# Patient Record
Sex: FEMALE | Race: WHITE | NOT HISPANIC OR LATINO | Employment: PART TIME | URBAN - METROPOLITAN AREA
[De-identification: names, ages, dates, MRNs, and addresses within clinical notes are randomized per-mention and may not be internally consistent; named-entity substitution may affect disease eponyms.]

---
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
## 2020-02-06 ENCOUNTER — OFFICE VISIT (OUTPATIENT)
Dept: URGENT CARE | Facility: PHYSICIAN GROUP | Age: 20
End: 2020-02-06
Payer: COMMERCIAL

## 2020-02-06 ENCOUNTER — HOSPITAL ENCOUNTER (OUTPATIENT)
Dept: RADIOLOGY | Facility: MEDICAL CENTER | Age: 20
End: 2020-02-06
Attending: FAMILY MEDICINE
Payer: COMMERCIAL

## 2020-02-06 VITALS
DIASTOLIC BLOOD PRESSURE: 70 MMHG | RESPIRATION RATE: 12 BRPM | BODY MASS INDEX: 20.89 KG/M2 | OXYGEN SATURATION: 97 % | SYSTOLIC BLOOD PRESSURE: 98 MMHG | HEART RATE: 78 BPM | TEMPERATURE: 99 F | WEIGHT: 130 LBS | HEIGHT: 66 IN

## 2020-02-06 DIAGNOSIS — R10.84 GENERALIZED ABDOMINAL PAIN: ICD-10-CM

## 2020-02-06 DIAGNOSIS — R10.84 GENERALIZED ABDOMINAL PAIN: Primary | ICD-10-CM

## 2020-02-06 LAB

## 2020-02-06 PROCEDURE — 99214 OFFICE O/P EST MOD 30 MIN: CPT | Performed by: FAMILY MEDICINE

## 2020-02-06 PROCEDURE — 74018 RADEX ABDOMEN 1 VIEW: CPT

## 2020-02-06 PROCEDURE — 81025 URINE PREGNANCY TEST: CPT | Performed by: FAMILY MEDICINE

## 2020-02-06 PROCEDURE — 81002 URINALYSIS NONAUTO W/O SCOPE: CPT | Performed by: FAMILY MEDICINE

## 2020-02-07 ENCOUNTER — TELEPHONE (OUTPATIENT)
Dept: URGENT CARE | Facility: PHYSICIAN GROUP | Age: 20
End: 2020-02-07

## 2020-02-07 NOTE — PROGRESS NOTES
"Subjective:   Lizeth Palmer  is a 20 y.o. female who presents for Abdominal Pain (bloating, sharp pains, lower ABD, started last night)        Abdominal Pain   This is a new problem. The current episode started yesterday. The onset quality is sudden. The problem occurs constantly. The problem has been waxing and waning. The pain is located in the generalized abdominal region. The pain is moderate. The quality of the pain is colicky and cramping. Associated symptoms include anorexia. Pertinent negatives include no diarrhea, dysuria, fever, hematochezia, hematuria, melena, nausea or vomiting.     Review of Systems   Constitutional: Negative for fever.   Gastrointestinal: Positive for abdominal pain and anorexia. Negative for diarrhea, hematochezia, melena, nausea and vomiting.   Genitourinary: Negative for dysuria and hematuria.     Allergies   Allergen Reactions   • Anti-Itch Rash      Objective:   BP (!) 98/70   Pulse 78   Temp 37.2 °C (99 °F) (Temporal)   Resp 12   Ht 1.676 m (5' 6\")   Wt 59 kg (130 lb)   LMP 01/23/2020 (Approximate)   SpO2 97%   BMI 20.98 kg/m²   Physical Exam  Constitutional:       General: She is not in acute distress.     Appearance: She is well-developed.   HENT:      Head: Normocephalic and atraumatic.   Eyes:      Conjunctiva/sclera: Conjunctivae normal.      Pupils: Pupils are equal, round, and reactive to light.   Cardiovascular:      Rate and Rhythm: Normal rate and regular rhythm.      Heart sounds: No murmur.   Pulmonary:      Effort: Pulmonary effort is normal. No respiratory distress.      Breath sounds: Normal breath sounds.   Abdominal:      General: There is distension.      Palpations: Abdomen is soft.      Tenderness: There is tenderness. There is no right CVA tenderness, left CVA tenderness, guarding or rebound.   Skin:     General: Skin is warm and dry.   Neurological:      Mental Status: She is alert and oriented to person, place, and time.      Sensory: No sensory " deficit.      Deep Tendon Reflexes: Reflexes are normal and symmetric.           Assessment/Plan:   1. Generalized abdominal pain  - [id number] VIEW; Future  - POCT Urinalysis  - POCT Pregnancy  Moderate stool burden on my read of KUB. OTC colace per 's directions.   Differential diagnosis, natural history, supportive care, and indications for immediate follow-up discussed.

## 2020-02-10 ASSESSMENT — ENCOUNTER SYMPTOMS
ABDOMINAL PAIN: 1
ANOREXIA: 1
FEVER: 0
NAUSEA: 0
HEMATOCHEZIA: 0
DIARRHEA: 0
VOMITING: 0

## 2020-07-09 ENCOUNTER — OFFICE VISIT (OUTPATIENT)
Dept: MEDICAL GROUP | Facility: PHYSICIAN GROUP | Age: 20
End: 2020-07-09
Payer: COMMERCIAL

## 2020-07-09 VITALS
DIASTOLIC BLOOD PRESSURE: 62 MMHG | RESPIRATION RATE: 16 BRPM | TEMPERATURE: 98.3 F | HEART RATE: 75 BPM | BODY MASS INDEX: 21.26 KG/M2 | HEIGHT: 66 IN | SYSTOLIC BLOOD PRESSURE: 100 MMHG | WEIGHT: 132.28 LBS | OXYGEN SATURATION: 97 %

## 2020-07-09 DIAGNOSIS — Z30.41 ENCOUNTER FOR SURVEILLANCE OF CONTRACEPTIVE PILLS: ICD-10-CM

## 2020-07-09 DIAGNOSIS — G43.109 MIGRAINE WITH AURA AND WITHOUT STATUS MIGRAINOSUS, NOT INTRACTABLE: ICD-10-CM

## 2020-07-09 DIAGNOSIS — Z00.00 ROUTINE PHYSICAL EXAMINATION: ICD-10-CM

## 2020-07-09 DIAGNOSIS — N94.6 DYSMENORRHEA: ICD-10-CM

## 2020-07-09 PROCEDURE — 99395 PREV VISIT EST AGE 18-39: CPT | Performed by: FAMILY MEDICINE

## 2020-07-09 RX ORDER — NORGESTIMATE AND ETHINYL ESTRADIOL 7DAYSX3 28
1 KIT ORAL
Qty: 28 TAB | Refills: 11 | Status: SHIPPED | OUTPATIENT
Start: 2020-07-09 | End: 2021-06-28 | Stop reason: SDUPTHER

## 2020-07-09 ASSESSMENT — LIFESTYLE VARIABLES
DURING THE PAST 12 MONTHS, ON HOW MANY DAYS DID YOU DRINK MORE THAN A FEW SIPS OF BEER, WINE, OR ANY DRINK CONTAINING ALCOHOL: 0
DURING THE PAST 12 MONTHS, ON HOW MANY DAYS DID YOU USE ANY MARIJUANA: 0
DURING THE PAST 12 MONTHS, ON HOW MANY DAYS DID YOU USE ANY TOBACCO OR NICOTINE PRODUCTS: 0
PART A TOTAL SCORE: 0
DURING THE PAST 12 MONTHS, ON HOW MANY DAYS DID YOU USE ANYTHING ELSE TO GET HIGH: 0

## 2020-07-09 ASSESSMENT — FIBROSIS 4 INDEX: FIB4 SCORE: 0.34

## 2020-07-09 ASSESSMENT — PATIENT HEALTH QUESTIONNAIRE - PHQ9: CLINICAL INTERPRETATION OF PHQ2 SCORE: 0

## 2020-07-10 NOTE — PROGRESS NOTES
Subjective:      Lizeth Palmer is a 20 y.o. female who presents with Annual Exam            HPI     Patient returns for annual physical exam.  She is due for HPV vaccination.  She will be due for GYN exam/Pap smear when she turns 21 next year.    She continues to take OCP to manage for dysmenorrhea as well as for contraception.  This has been working well for her without any problems.  This is helping control her painful menstrual cramps.    She said her migraine headache is very rare happening once a year.  She takes Treximet as needed for abortive treatment with good results.  She still has supply of the medication.    I reviewed the following    Past Medical History:   Diagnosis Date   • Dysmenorrhea    • Recurrent UTI         History reviewed. No pertinent surgical history.    Allergies   Allergen Reactions   • Anti-Itch Rash       Current Outpatient Medications   Medication Sig Dispense Refill   • Norgestim-Eth Estrad Triphasic 0.18/0.215/0.25 MG-35 MCG Tab Take 1 Tab by mouth every day. 28 Tab 11   • fluticasone (FLONASE) 50 MCG/ACT nasal spray Spray 2 Sprays in nose every day. 1 Bottle 5   • Sumatriptan-Naproxen Sodium  MG Tab TAKE 1 TABLET BY MOUTH AT ONSET OF HEADACHE AND REPEAT AFTER 2 HOURS AS NEEDED. TOTAL OF 2 TABLETS IN 24 HOURS 9 Tab 0     No current facility-administered medications for this visit.         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   • Occupation:    Social Needs   •  "Financial resource strain: Not on file   • Food insecurity     Worry: Not on file     Inability: Not on file   • Transportation needs     Medical: Not on file     Non-medical: Not on file   Tobacco Use   • Smoking status: Never Smoker   • Smokeless tobacco: Never Used   Substance and Sexual Activity   • Alcohol use: Yes     Alcohol/week: 0.0 oz     Comment: rare   • Drug use: No   • Sexual activity: Never   Lifestyle   • Physical activity     Days per week: Not on file     Minutes per session: Not on file   • Stress: Not on file   Relationships   • Social connections     Talks on phone: Not on file     Gets together: Not on file     Attends Advent service: Not on file     Active member of club or organization: Not on file     Attends meetings of clubs or organizations: Not on file     Relationship status: Not on file   • Intimate partner violence     Fear of current or ex partner: Not on file     Emotionally abused: Not on file     Physically abused: Not on file     Forced sexual activity: Not on file   Other Topics Concern   • Behavioral problems Not Asked   • Interpersonal relationships Not Asked   • Sad or not enjoying activities Not Asked   • Suicidal thoughts Not Asked   • Poor school performance Not Asked   • Reading difficulties Not Asked   • Speech difficulties Not Asked   • Writing difficulties Not Asked   • Inadequate sleep Not Asked   • Excessive TV viewing Not Asked   • Excessive video game use Not Asked   • Inadequate exercise Not Asked   • Sports related Not Asked   • Poor diet Not Asked   • Family concerns for drug/alcohol abuse Not Asked   • Poor oral hygiene Not Asked   • Bike safety Not Asked   • Family concerns vehicle safety Not Asked   Social History Narrative   • Not on file        ROS     As per HPI, the rest are negative.       Objective:     /62   Pulse 75   Temp 36.8 °C (98.3 °F)   Resp 16   Ht 1.676 m (5' 6\")   Wt 60 kg (132 lb 4.4 oz)   SpO2 97%   BMI 21.35 kg/m²  "     Physical Exam  Vitals signs and nursing note reviewed.   Constitutional:       General: She is not in acute distress.     Appearance: She is well-developed. She is not diaphoretic.   HENT:      Head: Normocephalic and atraumatic.      Right Ear: External ear normal.      Left Ear: External ear normal.      Nose: Nose normal.      Mouth/Throat:      Pharynx: No oropharyngeal exudate.   Eyes:      General: No scleral icterus.        Right eye: No discharge.         Left eye: No discharge.      Conjunctiva/sclera: Conjunctivae normal.      Pupils: Pupils are equal, round, and reactive to light.   Neck:      Musculoskeletal: Normal range of motion and neck supple.      Thyroid: No thyromegaly.      Vascular: No JVD.      Trachea: No tracheal deviation.   Cardiovascular:      Rate and Rhythm: Normal rate and regular rhythm.      Heart sounds: Normal heart sounds. No murmur. No friction rub. No gallop.    Pulmonary:      Effort: Pulmonary effort is normal. No respiratory distress.      Breath sounds: Normal breath sounds. No stridor. No wheezing or rales.   Chest:      Chest wall: No tenderness.   Abdominal:      General: Bowel sounds are normal. There is no distension.      Palpations: Abdomen is soft. There is no mass.      Tenderness: There is no abdominal tenderness. There is no guarding or rebound.      Hernia: No hernia is present.   Musculoskeletal: Normal range of motion.         General: No tenderness or deformity.   Lymphadenopathy:      Cervical: No cervical adenopathy.   Skin:     General: Skin is warm and dry.      Coloration: Skin is not pale.      Findings: No erythema or rash.   Neurological:      Mental Status: She is alert and oriented to person, place, and time.      Cranial Nerves: No cranial nerve deficit.      Motor: No abnormal muscle tone.      Coordination: Coordination normal.      Deep Tendon Reflexes: Reflexes are normal and symmetric. Reflexes normal.   Psychiatric:         Behavior:  Behavior normal.         Thought Content: Thought content normal.         Judgment: Judgment normal.                      Assessment/Plan:       1. Routine physical examination  Complete exam done.  We discussed HPV vaccination but she wants to hold off on this.  Made her aware she can always walk-in to get a shot or we can give it next year.  I told her this is covered by insurance after age 26.  She will need a flu shot in the fall.  Orders were done for screening labs.  She will return yearly for physical exam and next year she will need to have a Pap smear when she turns 21.  - Lipid Profile; Future  - Comp Metabolic Panel; Future  - CBC WITH DIFFERENTIAL; Future    2. Dysmenorrhea  Doing very well on OCP with good control of her menstrual cramps.  - Norgestim-Eth Estrad Triphasic 0.18/0.215/0.25 MG-35 MCG Tab; Take 1 Tab by mouth every day.  Dispense: 28 Tab; Refill: 11    3. Encounter for surveillance of contraceptive pills  Continue OCP.  Refills given.  - Norgestim-Eth Estrad Triphasic 0.18/0.215/0.25 MG-35 MCG Tab; Take 1 Tab by mouth every day.  Dispense: 28 Tab; Refill: 11    4. Migraine with aura and without status migrainosus, not intractable  She only gets one episode in a year.  She continues to take Treximet for abortive treatment as needed.  She still has supply of the medication.    Return in 1 year for GYN exam/Pap smear or sooner if needed.      Please note that this dictation was created using voice recognition software. I have worked with consultants from the vendor as well as technical experts from [facility] to optimize the interface. I have made every reasonable attempt to correct obvious errors, but I expect that there are errors of grammar and possibly content I did not discover before finalizing the note.

## 2021-06-28 DIAGNOSIS — N94.6 DYSMENORRHEA: ICD-10-CM

## 2021-06-28 DIAGNOSIS — Z30.41 ENCOUNTER FOR SURVEILLANCE OF CONTRACEPTIVE PILLS: ICD-10-CM

## 2021-06-28 RX ORDER — NORGESTIMATE AND ETHINYL ESTRADIOL 7DAYSX3 28
1 KIT ORAL
Qty: 28 TABLET | Refills: 0 | Status: SHIPPED | OUTPATIENT
Start: 2021-06-28 | End: 2021-07-28 | Stop reason: SDUPTHER

## 2021-06-28 NOTE — TELEPHONE ENCOUNTER
Received request via: Patient    Was the patient seen in the last year in this department? No  LAST SEEN 07/09/.2020    Does the patient have an active prescription (recently filled or refills available) for medication(s) requested? No     PT STATES THAT SHE IS COMPLETELY OUT.

## 2021-07-28 DIAGNOSIS — N94.6 DYSMENORRHEA: ICD-10-CM

## 2021-07-28 DIAGNOSIS — Z30.41 ENCOUNTER FOR SURVEILLANCE OF CONTRACEPTIVE PILLS: ICD-10-CM

## 2021-07-28 RX ORDER — NORGESTIMATE AND ETHINYL ESTRADIOL 7DAYSX3 28
1 KIT ORAL
Qty: 28 TABLET | Refills: 0 | Status: SHIPPED | OUTPATIENT
Start: 2021-07-28 | End: 2021-08-04 | Stop reason: SDUPTHER

## 2021-07-28 NOTE — TELEPHONE ENCOUNTER
Received request via: Patient    Was the patient seen in the last year in this department? No     Does the patient have an active prescription (recently filled or refills available) for medication(s) requested?  NO     Patient has appt scheduled for 8/4/21

## 2021-08-04 ENCOUNTER — TELEPHONE (OUTPATIENT)
Dept: MEDICAL GROUP | Facility: PHYSICIAN GROUP | Age: 21
End: 2021-08-04

## 2021-08-04 ENCOUNTER — OFFICE VISIT (OUTPATIENT)
Dept: MEDICAL GROUP | Facility: PHYSICIAN GROUP | Age: 21
End: 2021-08-04
Payer: COMMERCIAL

## 2021-08-04 VITALS
DIASTOLIC BLOOD PRESSURE: 60 MMHG | OXYGEN SATURATION: 93 % | BODY MASS INDEX: 22.88 KG/M2 | WEIGHT: 137.35 LBS | TEMPERATURE: 98.8 F | HEIGHT: 65 IN | SYSTOLIC BLOOD PRESSURE: 100 MMHG | HEART RATE: 63 BPM

## 2021-08-04 DIAGNOSIS — Z30.41 ENCOUNTER FOR SURVEILLANCE OF CONTRACEPTIVE PILLS: ICD-10-CM

## 2021-08-04 DIAGNOSIS — G43.109 MIGRAINE WITH AURA AND WITHOUT STATUS MIGRAINOSUS, NOT INTRACTABLE: ICD-10-CM

## 2021-08-04 DIAGNOSIS — N94.6 DYSMENORRHEA: ICD-10-CM

## 2021-08-04 DIAGNOSIS — G44.209 TENSION HEADACHE: ICD-10-CM

## 2021-08-04 PROCEDURE — 99214 OFFICE O/P EST MOD 30 MIN: CPT | Performed by: FAMILY MEDICINE

## 2021-08-04 RX ORDER — NORGESTIMATE AND ETHINYL ESTRADIOL 7DAYSX3 28
1 KIT ORAL
Qty: 28 TABLET | Refills: 11 | Status: SHIPPED
Start: 2021-08-04 | End: 2022-01-01

## 2021-08-04 RX ORDER — BUTALBITAL, ACETAMINOPHEN AND CAFFEINE 300; 40; 50 MG/1; MG/1; MG/1
1 CAPSULE ORAL EVERY 4 HOURS PRN
Qty: 60 CAPSULE | Refills: 0 | Status: SHIPPED | OUTPATIENT
Start: 2021-08-04 | End: 2021-10-19 | Stop reason: SDUPTHER

## 2021-08-04 ASSESSMENT — PATIENT HEALTH QUESTIONNAIRE - PHQ9: CLINICAL INTERPRETATION OF PHQ2 SCORE: 0

## 2021-08-04 NOTE — LETTER
[facility]  Monse Mccullough M.D.  [address]  [place] [address]  Fax: [phone]   Authorization for Release/Disclosure of   Protected Health Information   Name: JUSTO PALMER : 2000 SSN: [id number]   Address: [address] [place] [address]   [place] [address] Phone:    [phone] (home) [phone] (work)   I authorize the entity listed below to release/disclose the PHI below to:   [facility]/Monse Mccullough M.D. and Monse Mccullough M.D.   Provider or Entity Name:    [facility]   Address   City, State, [facility]   Phone:      Fax:     Reason for request: continuity of care   Information to be released:    [  ] LAST COLONOSCOPY,  including any PATH REPORT and follow-up  [  ] LAST FIT/COLOGUARD RESULT [  ] LAST DEXA  [  ] LAST MAMMOGRAM  [  ] LAST PAP  [  ] LAST LABS [  ] RETINA EXAM REPORT  [  ] IMMUNIZATION RECORDS  [  ] Release all info      [  ] Check here and initial the line next to each item to release ALL health information INCLUDING  _____ Care and treatment for drug and / or alcohol abuse  _____ HIV testing, infection status, or AIDS  _____ Genetic Testing    DATES OF SERVICE OR TIME PERIOD TO BE DISCLOSED: _____________  I understand and acknowledge that:  * This Authorization may be revoked at any time by you in writing, except if your health information has already been used or disclosed.  * Your health information that will be used or disclosed as a result of you signing this authorization could be re-disclosed by the recipient. If this occurs, your re-disclosed health information may no longer be protected by State or Federal laws.  * You may refuse to sign this Authorization. Your refusal will not affect your ability to obtain treatment.  * This Authorization becomes effective upon signing and will  on (date) __________.      If no date is indicated, this Authorization will  one (1) year from the signature date.    Name: Justo Palmer    Signature:   Date:     2021            PLEASE FAX REQUESTED RECORDS BACK TO: [phone]

## 2021-08-05 NOTE — PROGRESS NOTES
"Subjective:      Lizeth Palmer is a 21 y.o. female who presents with Contraception (refill)            HPI     Patient is here for follow-up and to get refills of OCP that she takes for management of dysmenorrhea.  This is working very well for her and she would like to continue taking the medication.    She is also here for follow-up of her migraine headache.  She said she rarely has attack of migraine headache.  She said she may have 1 or 2 a year and the last one was 1-1/2 months ago.    She is asking for refill of Fioricet for tension headache.  This works well for the headache.  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  Her tension headache is triggered by school work.  She would like to have medication on hand that she can take if she needs it.  This headache is different from her migraine headache and she is very not eligible on which headache is which and which medication to take for the type of headache.    She just turned 21 and she is due for GYN exam/Pap smear.  She said she will come back another time to get this done.      Past medical history, past surgical history, family history reviewed-no changes    Social history reviewed-no changes    Allergies reviewed-no changes    Medications reviewed-no changes    ROS     As per HPI, the rest are negative.       Objective:     /60 (BP Location: Left arm, Patient Position: Sitting, BP Cuff Size: Adult)   Pulse 63   Temp 37.1 °C (98.8 °F) (Temporal)   Ht 1.651 m (5' 5\")   Wt 62.3 kg (137 lb 5.6 oz)   SpO2 93%   BMI 22.86 kg/m²      Physical Exam     Examined alert, awake, oriented, not in distress    Neck-supple, no lymphadenopathy, no thyromegaly  Lungs-clear to auscultation, no rales, no wheezes  Heart-regular rate and rhythm, no murmur  Extremities-no edema, clubbing, cyanosis              Assessment/Plan:        1. Dysmenorrhea  Doing well and stable on OCP.  Prescription refills given for " full year supply.  We can transfer it to pharmacy in [place] when she is already living there for chiropractor school.  She will let me know.  - Norgestim-Eth Estrad Triphasic 0.18/0.215/0.25 MG-35 MCG Tab; Take 1 tablet by mouth every day.  Dispense: 28 tablet; Refill: 11    2. Encounter for surveillance of contraceptive pills  Same as #1.  - Norgestim-Eth Estrad Triphasic 0.18/0.215/0.25 MG-35 MCG Tab; Take 1 tablet by mouth every day.  Dispense: 28 tablet; Refill: 11    3. Migraine with aura and without status migrainosus, not intractable  She takes Treximet as needed with good results.  Migraine headaches are very infrequent.    4. Tension headache  She is asking for Fioricet for tension headache in case she develops this headache when she starts chiropractor school.  I gave her the number 60 tablets.  She can ask for refill later on if needed.  - acetaminophen/caffeine/butalbital 300-40-50 mg (FIORICET) -40 MG Cap capsule; Take 1 capsule by mouth every four hours as needed for Headache for up to 30 days.  Dispense: 60 capsule; Refill: 0    Return for separate visit for GYN exam/Pap smear.      Please note that this dictation was created using voice recognition software. I have worked with consultants from the vendor as well as technical experts from [facility] to optimize the interface. I have made every reasonable attempt to correct obvious errors, but I expect that there are errors of grammar and possibly content I did not discover before finalizing the note.

## 2021-08-09 DIAGNOSIS — M25.562 LEFT KNEE PAIN, UNSPECIFIED CHRONICITY: ICD-10-CM

## 2021-08-31 DIAGNOSIS — G44.209 TENSION HEADACHE: ICD-10-CM

## 2021-08-31 DIAGNOSIS — Z30.41 ENCOUNTER FOR SURVEILLANCE OF CONTRACEPTIVE PILLS: ICD-10-CM

## 2021-08-31 DIAGNOSIS — N94.6 DYSMENORRHEA: ICD-10-CM

## 2021-08-31 RX ORDER — BUTALBITAL, ACETAMINOPHEN AND CAFFEINE 300; 40; 50 MG/1; MG/1; MG/1
1 CAPSULE ORAL EVERY 4 HOURS PRN
Qty: 60 CAPSULE | Refills: 0 | OUTPATIENT
Start: 2021-08-31 | End: 2021-09-30

## 2021-08-31 RX ORDER — NORGESTIMATE AND ETHINYL ESTRADIOL 7DAYSX3 28
1 KIT ORAL
Qty: 28 TABLET | Refills: 0 | OUTPATIENT
Start: 2021-08-31

## 2021-09-05 ENCOUNTER — HOSPITAL ENCOUNTER (OUTPATIENT)
Facility: MEDICAL CENTER | Age: 21
End: 2021-09-05
Attending: NURSE PRACTITIONER
Payer: COMMERCIAL

## 2021-09-05 ENCOUNTER — OFFICE VISIT (OUTPATIENT)
Dept: URGENT CARE | Facility: PHYSICIAN GROUP | Age: 21
End: 2021-09-05
Payer: COMMERCIAL

## 2021-09-05 VITALS
OXYGEN SATURATION: 98 % | HEART RATE: 88 BPM | HEIGHT: 65 IN | SYSTOLIC BLOOD PRESSURE: 126 MMHG | TEMPERATURE: 98.2 F | DIASTOLIC BLOOD PRESSURE: 84 MMHG | RESPIRATION RATE: 18 BRPM | BODY MASS INDEX: 21.99 KG/M2 | WEIGHT: 132 LBS

## 2021-09-05 DIAGNOSIS — R30.0 DYSURIA: ICD-10-CM

## 2021-09-05 DIAGNOSIS — N89.8 VAGINAL DISCHARGE: ICD-10-CM

## 2021-09-05 DIAGNOSIS — R35.0 URINARY FREQUENCY: ICD-10-CM

## 2021-09-05 DIAGNOSIS — N30.00 ACUTE CYSTITIS WITHOUT HEMATURIA: ICD-10-CM

## 2021-09-05 LAB
APPEARANCE UR: CLEAR
BILIRUB UR STRIP-MCNC: NORMAL MG/DL
COLOR UR AUTO: YELLOW
GLUCOSE UR STRIP.AUTO-MCNC: NORMAL MG/DL
KETONES UR STRIP.AUTO-MCNC: NORMAL MG/DL
LEUKOCYTE ESTERASE UR QL STRIP.AUTO: NORMAL
NITRITE UR QL STRIP.AUTO: NORMAL
PH UR STRIP.AUTO: 7 [PH] (ref 5–8)
PROT UR QL STRIP: NORMAL MG/DL
RBC UR QL AUTO: NORMAL
SP GR UR STRIP.AUTO: 1.01
UROBILINOGEN UR STRIP-MCNC: 0.2 MG/DL

## 2021-09-05 PROCEDURE — 87510 GARDNER VAG DNA DIR PROBE: CPT

## 2021-09-05 PROCEDURE — 81002 URINALYSIS NONAUTO W/O SCOPE: CPT | Performed by: NURSE PRACTITIONER

## 2021-09-05 PROCEDURE — 99214 OFFICE O/P EST MOD 30 MIN: CPT | Performed by: NURSE PRACTITIONER

## 2021-09-05 PROCEDURE — 87491 CHLMYD TRACH DNA AMP PROBE: CPT

## 2021-09-05 PROCEDURE — 87480 CANDIDA DNA DIR PROBE: CPT

## 2021-09-05 PROCEDURE — 87660 TRICHOMONAS VAGIN DIR PROBE: CPT

## 2021-09-05 PROCEDURE — 87591 N.GONORRHOEAE DNA AMP PROB: CPT

## 2021-09-05 PROCEDURE — 87086 URINE CULTURE/COLONY COUNT: CPT

## 2021-09-05 RX ORDER — NITROFURANTOIN 25; 75 MG/1; MG/1
100 CAPSULE ORAL EVERY 12 HOURS
Qty: 10 CAPSULE | Refills: 0 | Status: SHIPPED | OUTPATIENT
Start: 2021-09-05 | End: 2021-09-10

## 2021-09-05 ASSESSMENT — ENCOUNTER SYMPTOMS
FLANK PAIN: 1
CHILLS: 0
VOMITING: 0
NAUSEA: 0
FEVER: 0
SWEATS: 0

## 2021-09-05 NOTE — PROGRESS NOTES
When I just about him I mean they may choose to discharge him but:     Lizeth Palmer is a 21 y.o. female who presents for Dysuria (dysuria, frequency, mild discharge, low back pain x2 wks)      Dysuria   This is a new problem. The current episode started 1 to 4 weeks ago (2 weeks ago Lizeth developed dysuria, frequency and increased vaginal discharge.). The problem occurs intermittently. The problem has been unchanged (She notes her symptoms did improve after her menses adn then returned. ). The pain is at a severity of 3/10. The pain is mild. There has been no fever. She is sexually active. There is no history of pyelonephritis. Associated symptoms include a discharge, flank pain and frequency. Pertinent negatives include no chills, hematuria, hesitancy, nausea, possible pregnancy, sweats, urgency or vomiting. Treatments tried: Azo. The treatment provided significant relief.         Review of Systems   Constitutional: Negative for chills, fever and malaise/fatigue.   Gastrointestinal: Negative for nausea and vomiting.   Genitourinary: Positive for dysuria, flank pain and frequency. Negative for hematuria, hesitancy and urgency.       PMH:   Past Medical History:   Diagnosis Date   • Dysmenorrhea    • Recurrent UTI      ALLERGIES:   Allergies   Allergen Reactions   • Anti-Itch Rash     SURGHX: History reviewed. No pertinent surgical history.  SOCHX:   Social History     Socioeconomic History   • Marital status: Single     Spouse name: Not on file   • Number of children: Not on file   • Years of education: Not on file   • Highest education level: Not on file   Occupational History   • Occupation: R college student -kineseology   • Occupation:    Tobacco Use   • Smoking status: Never Smoker   • Smokeless tobacco: Never Used   Vaping Use   • Vaping Use: Never used   Substance and Sexual Activity   • Alcohol use: Yes     Alcohol/week: 0.0 oz     Comment: rare   • Drug use: No   • Sexual activity:  Never   Other Topics Concern   • Behavioral problems Not Asked   • Interpersonal relationships Not Asked   • Sad or not enjoying activities Not Asked   • Suicidal thoughts Not Asked   • Poor school performance Not Asked   • Reading difficulties Not Asked   • Speech difficulties Not Asked   • Writing difficulties Not Asked   • Inadequate sleep Not Asked   • Excessive TV viewing Not Asked   • Excessive video game use Not Asked   • Inadequate exercise Not Asked   • Sports related Not Asked   • Poor diet Not Asked   • Family concerns for drug/alcohol abuse Not Asked   • Poor oral hygiene Not Asked   • Bike safety Not Asked   • Family concerns vehicle safety Not Asked   Social History Narrative   • Not on file     Social Determinants of Health     Financial Resource Strain:    • Difficulty of Paying Living Expenses:    Food Insecurity:    • Worried About Running Out of Food in the Last Year:    • Ran Out of Food in the Last Year:    Transportation Needs:    • Lack of Transportation (Medical):    • Lack of Transportation (Non-Medical):    Physical Activity:    • Days of Exercise per Week:    • Minutes of Exercise per Session:    Stress:    • Feeling of Stress :    Social Connections:    • Frequency of Communication with Friends and Family:    • Frequency of Social Gatherings with Friends and Family:    • Attends Gn[employer] Services:    • Active Member of Clubs or Organizations:    • Attends Club or Organization Meetings:    • Marital Status:    Intimate Partner Violence:    • Fear of Current or Ex-Partner:    • Emotionally Abused:    • Physically Abused:    • Sexually Abused:      FH:   Family History   Problem Relation Age of Onset   • GI Disease Mother         gerd   • Thyroid Mother         hypothyroidism   • GI Disease Father         diverticulosis    • No Known Problems Maternal Grandmother    • No Known Problems Maternal Grandfather          Objective:   /84 (BP Location: Left arm, Patient Position: Sitting, BP  "Cuff Size: Small adult)   Pulse 88   Temp 36.8 °C (98.2 °F) (Temporal)   Resp 18   Ht 1.651 m (5' 5\")   Wt 59.9 kg (132 lb)   SpO2 98%   BMI 21.97 kg/m²     Physical Exam  Vitals and nursing note reviewed.   Constitutional:       General: She is not in acute distress.     Appearance: Normal appearance. She is not ill-appearing.   HENT:      Head: Normocephalic and atraumatic.      Right Ear: External ear normal.      Left Ear: External ear normal.      Nose: No congestion or rhinorrhea.      Mouth/Throat:      Mouth: Mucous membranes are moist.   Eyes:      Extraocular Movements: Extraocular movements intact.      Pupils: Pupils are equal, round, and reactive to light.   Cardiovascular:      Rate and Rhythm: Normal rate and regular rhythm.      Pulses: Normal pulses.      Heart sounds: Normal heart sounds.   Pulmonary:      Effort: Pulmonary effort is normal.      Breath sounds: Normal breath sounds.   Abdominal:      General: Abdomen is flat. Bowel sounds are normal.      Palpations: Abdomen is soft.      Tenderness: There is no abdominal tenderness. There is no right CVA tenderness or left CVA tenderness.   Musculoskeletal:         General: Normal range of motion.      Cervical back: Normal range of motion and neck supple.   Skin:     General: Skin is warm and dry.      Capillary Refill: Capillary refill takes less than 2 seconds.   Neurological:      General: No focal deficit present.      Mental Status: She is alert and oriented to person, place, and time. Mental status is at baseline.   Psychiatric:         Mood and Affect: Mood normal.         Behavior: Behavior normal.         Thought Content: Thought content normal.         Judgment: Judgment normal.     POCT urine: Negative    Assessment/Plan:   Assessment    1. Acute cystitis without hematuria  Urine Culture    nitrofurantoin (MACROBID) 100 MG Cap   2. Dysuria  POCT Urinalysis    Urine Culture   3. Urinary frequency  POCT Urinalysis    Urine Culture "   4. Vaginal discharge  CHLAMYDIA/GC PCR URINE OR SWAB    VAGINAL PATHOGENS DNA PANEL     She was tested for gonorrhea/chlamydia, BV, trichomonas and yeast.  Urine was sent for culture.  She was empirically started on antibiotic today.  I will call her with results.  Patient to follow-up for fever, chills, nausea/vomiting or flank pain.  AVS handout given and reviewed with patient. Pt educated on red flags and when to seek treatment back in ER or UC.

## 2021-09-05 NOTE — PATIENT INSTRUCTIONS
Urinary Tract Infection, Adult  A urinary tract infection (UTI) is an infection of any part of the urinary tract. The urinary tract includes:  · The kidneys.  · The ureters.  · The bladder.  · The urethra.  These organs make, store, and get rid of pee (urine) in the body.  What are the causes?  This is caused by germs (bacteria) in your genital area. These germs grow and cause swelling (inflammation) of your urinary tract.  What increases the risk?  You are more likely to develop this condition if:  · You have a small, thin tube (catheter) to drain pee.  · You cannot control when you pee or poop (incontinence).  · You are female, and:  ? You use these methods to prevent pregnancy:  ? A medicine that kills sperm (spermicide).  ? A device that blocks sperm (diaphragm).  ? You have low levels of a female hormone (estrogen).  ? You are pregnant.  · You have genes that add to your risk.  · You are sexually active.  · You take antibiotic medicines.  · You have trouble peeing because of:  ? A prostate that is bigger than normal, if you are male.  ? A blockage in the part of your body that drains pee from the bladder (urethra).  ? A kidney stone.  ? A nerve condition that affects your bladder (neurogenic bladder).  ? Not getting enough to drink.  ? Not peeing often enough.  · You have other conditions, such as:  ? Diabetes.  ? A weak disease-fighting system (immune system).  ? Sickle cell disease.  ? Gout.  ? Injury of the spine.  What are the signs or symptoms?  Symptoms of this condition include:  · Needing to pee right away (urgently).  · Peeing often.  · Peeing small amounts often.  · Pain or burning when peeing.  · Blood in the pee.  · Pee that smells bad or not like normal.  · Trouble peeing.  · Pee that is cloudy.  · Fluid coming from the vagina, if you are female.  · Pain in the belly or lower back.  Other symptoms include:  · Throwing up (vomiting).  · No urge to eat.  · Feeling mixed up (confused).  · Being  tired and grouchy (irritable).  · A fever.  · Watery poop (diarrhea).  How is this treated?  This condition may be treated with:  · Antibiotic medicine.  · Other medicines.  · Drinking enough water.  Follow these instructions at home:    Medicines  · Take over-the-counter and prescription medicines only as told by your doctor.  · If you were prescribed an antibiotic medicine, take it as told by your doctor. Do not stop taking it even if you start to feel better.  General instructions  · Make sure you:  ? Pee until your bladder is empty.  ? Do not hold pee for a long time.  ? Empty your bladder after sex.  ? Wipe from front to back after pooping if you are a female. Use each tissue one time when you wipe.  · Drink enough fluid to keep your pee pale yellow.  · Keep all follow-up visits as told by your doctor. This is important.  Contact a doctor if:  · You do not get better after 1-2 days.  · Your symptoms go away and then come back.  Get help right away if:  · You have very bad back pain.  · You have very bad pain in your lower belly.  · You have a fever.  · You are sick to your stomach (nauseous).  · You are throwing up.  Summary  · A urinary tract infection (UTI) is an infection of any part of the urinary tract.  · This condition is caused by germs in your genital area.  · There are many risk factors for a UTI. These include having a small, thin tube to drain pee and not being able to control when you pee or poop.  · Treatment includes antibiotic medicines for germs.  · Drink enough fluid to keep your pee pale yellow.  This information is not intended to replace advice given to you by your health care provider. Make sure you discuss any questions you have with your health care provider.  Document Released: 06/05/2009 Document Revised: 12/05/2019 Document Reviewed: 06/27/2019  Elsevier Patient Education © 2020 [employer]    Gonorrhea  Gonorrhea is a sexually transmitted disease (STD) that can affect both men and  women. If left untreated, this infection can:  · Damage the female or male organs.  · Cause women and men to be unable to have children (be sterile).  · Harm a fetus if an infected woman is pregnant.  It is important to get treatment for gonorrhea as soon as possible. It is also necessary for all of your sexual partners to be tested for the infection.  What are the causes?  This condition is caused by bacteria called Neisseria gonorrhoeae. The infection is spread from person to person through sexual contact, including oral, anal, and vaginal sex. A  can contract the infection from his or her mother during birth.  What increases the risk?  The following factors may make you more likely to develop this condition:  · Being a woman who is younger than 25 years of age and who is sexually active.  · Being a woman 25 years of age or older who has:  ? A new sex partner.  ? More than one sex partner.  ? A sex partner who has an STD.  · Being a man who has:  ? A new sex partner.  ? More than one sex partner.  ? A sex partner who has an STD.  · Using condoms inconsistently.  · Currently having, or having previously had, an STD.  · Exchanging sex for money or drugs.  What are the signs or symptoms?  Some people do not have any symptoms. If you do have symptoms, they may be different for females and males.  For females  · Pain in the lower abdomen.  · Abnormal vaginal discharge. The discharge may be cloudy, thick, or yellow-green in color.  · Bleeding between periods.  · Painful sex.  · Burning or itching in and around the vagina.  · Pain or burning when urinating.  · Irritation, pain, bleeding, or discharge from the rectum. This may occur if the infection was spread by anal sex.  · Sore throat or swollen lymph nodes in the neck. This may occur if the infection was spread by oral sex.  For males  · Abnormal discharge from the penis. This discharge may be cloudy, thick, or yellow-green in color.  · Pain or burning during  urination.  · Pain or swelling in the testicles.  · Irritation, pain, bleeding, or discharge from the rectum. This may occur if the infection was spread by anal sex.  · Sore throat, fever, or swollen lymph nodes in the neck. This may occur if the infection was spread by oral sex.  How is this diagnosed?  This condition is diagnosed based on:  · A physical exam.  · A sample of discharge that is examined under a microscope to look for the bacteria. The discharge may be taken from the urethra, cervix, throat, or rectum.  · Urine tests.  Not all of test results will be available during your visit.  How is this treated?  This condition is treated with antibiotic medicines. It is important for treatment to begin as soon as possible. Early treatment may prevent some problems from developing. Do not have sex during treatment. Avoid all types of sexual activity for 7 days after treatment is complete and until any sex partners have been treated.  Follow these instructions at home:  · Take over-the-counter and prescription medicines only as told by your health care provider.  · Take your antibiotic medicine as told by your health care provider. Do not stop taking the antibiotic even if you start to feel better.  · Do not have sex until at least 7 days after you and your partner(s) have finished treatment and your health care provider says it is okay.  · It is your responsibility to get your test results. Ask your health care provider, or the department performing the test, when your results will be ready.  · If you test positive for gonorrhea, inform your recent sexual partners. This includes any oral, anal, or vaginal sex partners. They need to be checked for gonorrhea even if they do not have symptoms. They may need treatment, even if they test negative for gonorrhea.  · Keep all follow-up visits as told by your health care provider. This is important.  How is this prevented?    · Use latex condoms correctly every time you  have sexual intercourse.  · Ask if your sexual partner has been tested for STDs and had negative results.  · Avoid having multiple sexual partners.  Contact a health care provider if:  · You develop a bad reaction to the medicine you were prescribed. This may include:  ? A rash.  ? Nausea.  ? Vomiting.  ? Diarrhea.  · Your symptoms do not get better after a few days of taking antibiotics.  · Your symptoms get worse.  · You develop new symptoms.  · Your pain gets worse.  · You have a fever.  · You develop pain, itching, or discharge around the eyes.  Get help right away if:  · You feel dizzy or faint.  · You have trouble breathing or have shortness of breath.  · You develop an irregular heartbeat.  · You have severe abdominal pain with or without shoulder pain.  · You develop any bumps or sores (lesions) on your skin.  · You develop warmth, redness, pain, or swelling around your joints, such as the knee.  Summary  · Gonorrhea is an STD that can affect both men and women.  · This condition is caused by bacteria called Neisseria gonorrhoeae. The infection is spread from person to person, usually through sexual contact, including oral, anal, and vaginal sex.  · Symptoms vary between males and females. Generally, they include abnormal discharge and burning during urination. Women may also experience painful sex, itching around the vagina, and bleeding between menstrual periods. Men may also experience swelling of the testicles.  · This condition is treated with antibiotic medicines. Do not have sex until at least 7 days after completing antibiotic treatment.  · If left untreated, gonorrhea can have serious side effects and complications.  This information is not intended to replace advice given to you by your health care provider. Make sure you discuss any questions you have with your health care provider.  Document Released: 12/15/2001 Document Revised: 01/24/2020 Document Reviewed: 11/17/2017  Elsevier Patient  Education © 2020 [employer]    Chlamydia, Female    Chlamydia is a STD (sexually transmitted disease). This is an infection that spreads through sexual contact. If it is not treated, it can cause serious problems. It must be treated with antibiotic medicine.  If this infection is not treated and you are pregnant or become pregnant, your baby could get it during delivery. This may cause bad health problems for the baby.  Sometimes, you may not have symptoms (asymptomatic). When you have symptoms, they can include:  · Burning when you pee (urinate).  · Peeing often.  · Fluid (discharge) coming from the vagina.  · Redness, soreness, and swelling (inflammation) of the butt (rectum).  · Bleeding or fluid coming from the butt.  · Belly (abdominal) pain.  · Pain during sex.  · Bleeding between periods.  · Itching, burning, or redness in the eyes.  · Fluid coming from the eyes.  Follow these instructions at home:  Medicines  · Take over-the-counter and prescription medicines only as told by your doctor.  · Take your antibiotic medicine as told by your doctor. Do not stop taking the antibiotic even if you start to feel better.  Sexual activity  · Tell sex partners about your infection. Sex partners are people you had oral, anal, or vaginal sex with within 60 days of when you started getting sick. They need treatment, too.  · Do not have sex until:  ? You and your sex partners have been treated.  ? Your doctor says it is okay.  · If you have a single dose treatment, wait 7 days before having sex.  General instructions  · It is up to you to get your test results. Ask your doctor when your results will be ready.  · Get a lot of rest.  · Eat healthy foods.  · Drink enough fluid to keep your pee (urine) clear or pale yellow.  · Keep all follow-up visits as told by your doctor. You may need tests after 3 months.  Preventing chlamydia  · The only way to prevent chlamydia is not to have sex. To lower your risk:  ? Use latex  condoms correctly. Do this every time you have sex.  ? Avoid having many sex partners.  ? Ask if your partner has been tested for STDs and if he or she had negative results.  Contact a doctor if:  · You get new symptoms.  · You do not get better with treatment.  · You have a fever or chills.  · You have pain during sex.  Get help right away if:  · Your pain gets worse and does not get better with medicine.  · You get flu-like symptoms, such as:  ? Night sweats.  ? Sore throat.  ? Muscle aches.  · You feel sick to your stomach (nauseous).  · You throw up (vomit).  · You have trouble swallowing.  · You have bleeding:  ? Between periods.  ? After sex.  · You have irregular periods.  · You have belly pain that does not get better with medicine.  · You have lower back pain that does not get better with medicine.  · You feel weak or dizzy.  · You pass out (faint).  · You are pregnant and you get symptoms of chlamydia.  Summary  · Chlamydia is an infection that spreads through sexual contact.  · Sometimes, chlamydia can cause no symptoms (asymptomatic).  · Do not have sex until your doctor says it is okay.  · All sex partners will have to be treated for chlamydia.  This information is not intended to replace advice given to you by your health care provider. Make sure you discuss any questions you have with your health care provider.  Document Released: 09/26/2009 Document Revised: 06/11/2019 Document Reviewed: 12/07/2017  [email or web address] Patient Education © 2020 [email or web address]    Bacterial Vaginosis    Bacterial vaginosis is a vaginal infection that occurs when the normal balance of bacteria in the vagina is disrupted. It results from an overgrowth of certain bacteria. This is the most common vaginal infection among women ages 15-44.  Because bacterial vaginosis increases your risk for STIs (sexually transmitted infections), getting treated can help reduce your risk for chlamydia, gonorrhea, herpes, and HIV (human immunodeficiency  virus). Treatment is also important for preventing complications in pregnant women, because this condition can cause an early (premature) delivery.  What are the causes?  This condition is caused by an increase in harmful bacteria that are normally present in small amounts in the vagina. However, the reason that the condition develops is not fully understood.  What increases the risk?  The following factors may make you more likely to develop this condition:  · Having a new sexual partner or multiple sexual partners.  · Having unprotected sex.  · Douching.  · Having an intrauterine device (IUD).  · Smoking.  · Drug and alcohol abuse.  · Taking certain antibiotic medicines.  · Being pregnant.  You cannot get bacterial vaginosis from toilet seats, bedding, swimming pools, or contact with objects around you.  What are the signs or symptoms?  Symptoms of this condition include:  · Grey or white vaginal discharge. The discharge can also be watery or foamy.  · A fish-like odor with discharge, especially after sexual intercourse or during menstruation.  · Itching in and around the vagina.  · Burning or pain with urination.  Some women with bacterial vaginosis have no signs or symptoms.  How is this diagnosed?  This condition is diagnosed based on:  · Your medical history.  · A physical exam of the vagina.  · Testing a sample of vaginal fluid under a microscope to look for a large amount of bad bacteria or abnormal cells. Your health care provider may use a cotton swab or a small wooden spatula to collect the sample.  How is this treated?  This condition is treated with antibiotics. These may be given as a pill, a vaginal cream, or a medicine that is put into the vagina (suppository). If the condition comes back after treatment, a second round of antibiotics may be needed.  Follow these instructions at home:  Medicines  · Take over-the-counter and prescription medicines only as told by your health care provider.  · Take or  use your antibiotic as told by your health care provider. Do not stop taking or using the antibiotic even if you start to feel better.  General instructions  · If you have a female sexual partner, tell her that you have a vaginal infection. She should see her health care provider and be treated if she has symptoms. If you have a male sexual partner, he does not need treatment.  · During treatment:  ? Avoid sexual activity until you finish treatment.  ? Do not douche.  ? Avoid alcohol as directed by your health care provider.  ? Avoid breastfeeding as directed by your health care provider.  · Drink enough water and fluids to keep your urine clear or pale yellow.  · Keep the area around your vagina and rectum clean.  ? Wash the area daily with warm water.  ? Wipe yourself from front to back after using the toilet.  · Keep all follow-up visits as told by your health care provider. This is important.  How is this prevented?  · Do not douche.  · Wash the outside of your vagina with warm water only.  · Use protection when having sex. This includes latex condoms and dental dams.  · Limit how many sexual partners you have. To help prevent bacterial vaginosis, it is best to have sex with just one partner (monogamous).  · Make sure you and your sexual partner are tested for STIs.  · Wear cotton or cotton-lined underwear.  · Avoid wearing tight pants and pantyhose, especially during summer.  · Limit the amount of alcohol that you drink.  · Do not use any products that contain nicotine or tobacco, such as cigarettes and e-cigarettes. If you need help quitting, ask your health care provider.  · Do not use illegal drugs.  Where to find more information  · Centers for Disease Control and Prevention: www.cdc.gov/std  · American Sexual Health Association (KATHY): www.ashastd.org  · U.S. Department of Health and Human Services, Office on Women's Health: www.womenshealth.gov/ or  https://www.womenshealth.gov/a-z-topics/bacterial-vaginosis  Contact a health care provider if:  · Your symptoms do not improve, even after treatment.  · You have more discharge or pain when urinating.  · You have a fever.  · You have pain in your abdomen.  · You have pain during sex.  · You have vaginal bleeding between periods.  Summary  · Bacterial vaginosis is a vaginal infection that occurs when the normal balance of bacteria in the vagina is disrupted.  · Because bacterial vaginosis increases your risk for STIs (sexually transmitted infections), getting treated can help reduce your risk for chlamydia, gonorrhea, herpes, and HIV (human immunodeficiency virus). Treatment is also important for preventing complications in pregnant women, because the condition can cause an early (premature) delivery.  · This condition is treated with antibiotic medicines. These may be given as a pill, a vaginal cream, or a medicine that is put into the vagina (suppository).  This information is not intended to replace advice given to you by your health care provider. Make sure you discuss any questions you have with your health care provider.  Document Released: 12/18/2006 Document Revised: 11/30/2018 Document Reviewed: 09/02/2017  [employer] Patient Education © 2020 [employer]

## 2021-09-06 LAB
CANDIDA DNA VAG QL PROBE+SIG AMP: NEGATIVE
G VAGINALIS DNA VAG QL PROBE+SIG AMP: POSITIVE
T VAGINALIS DNA VAG QL PROBE+SIG AMP: NEGATIVE

## 2021-09-07 DIAGNOSIS — B96.89 BACTERIAL VAGINOSIS: ICD-10-CM

## 2021-09-07 DIAGNOSIS — N76.0 BACTERIAL VAGINOSIS: ICD-10-CM

## 2021-09-07 RX ORDER — METRONIDAZOLE 500 MG/1
500 TABLET ORAL
Qty: 14 TABLET | Refills: 0 | Status: SHIPPED | OUTPATIENT
Start: 2021-09-07 | End: 2021-09-14

## 2021-09-08 LAB
BACTERIA UR CULT: NORMAL
SIGNIFICANT IND 70042: NORMAL
SITE SITE: NORMAL
SOURCE SOURCE: NORMAL

## 2021-10-10 ENCOUNTER — OFFICE VISIT (OUTPATIENT)
Dept: URGENT CARE | Facility: PHYSICIAN GROUP | Age: 21
End: 2021-10-10
Payer: COMMERCIAL

## 2021-10-10 ENCOUNTER — HOSPITAL ENCOUNTER (OUTPATIENT)
Facility: MEDICAL CENTER | Age: 21
End: 2021-10-10
Attending: PHYSICIAN ASSISTANT
Payer: COMMERCIAL

## 2021-10-10 VITALS
WEIGHT: 130.4 LBS | BODY MASS INDEX: 21.73 KG/M2 | DIASTOLIC BLOOD PRESSURE: 66 MMHG | TEMPERATURE: 98.7 F | HEIGHT: 65 IN | RESPIRATION RATE: 14 BRPM | SYSTOLIC BLOOD PRESSURE: 108 MMHG | HEART RATE: 108 BPM | OXYGEN SATURATION: 100 %

## 2021-10-10 DIAGNOSIS — J01.10 ACUTE NON-RECURRENT FRONTAL SINUSITIS: ICD-10-CM

## 2021-10-10 PROCEDURE — U0005 INFEC AGEN DETEC AMPLI PROBE: HCPCS

## 2021-10-10 PROCEDURE — 99213 OFFICE O/P EST LOW 20 MIN: CPT | Performed by: PHYSICIAN ASSISTANT

## 2021-10-10 PROCEDURE — U0003 INFECTIOUS AGENT DETECTION BY NUCLEIC ACID (DNA OR RNA); SEVERE ACUTE RESPIRATORY SYNDROME CORONAVIRUS 2 (SARS-COV-2) (CORONAVIRUS DISEASE [COVID-19]), AMPLIFIED PROBE TECHNIQUE, MAKING USE OF HIGH THROUGHPUT TECHNOLOGIES AS DESCRIBED BY CMS-2020-01-R: HCPCS

## 2021-10-10 NOTE — PROGRESS NOTES
"Subjective:   Lizeth Palmer is a 21 y.o. female who presents for Headache (pt is having sinus congestion, headache, ear pain  x 5 )      HPI  Patient is a 21-year-old female who presents to clinic with complaints of a sinus headache/sinus pressure onset 4 days ago. Location to frontal head. She reports of a dry throat, nasal congestion and also ear pain from ear pressure.  She states her cough is mild and intermittent due to postnasal drip.  She has tried Flonase nasal spray twice daily and blowing her nose but no other medications. Denies any fever, chills, chest pain, SOB, abdominal pain, vomiting, diarrhea. Nonsmoker. Did not receive covid vaccine.  History of COVID-19 without complication.  Patient's father with similar symptoms recently.      Medications:    • Norgestim-Eth Estrad Triphasic Tabs    Allergies: Anti-itch    Problem List: Lizeth Palmer does not have any pertinent problems on file.    Surgical History:  No past surgical history on file.    Past Social Hx: Lizeth Palmer  reports that she has never smoked. She has never used smokeless tobacco. She reports previous alcohol use. She reports current drug use. Drug: Marijuana.     Past Family Hx:  Lizeth Palmer family history includes GI Disease in her father and mother; No Known Problems in her maternal grandfather and maternal grandmother; Thyroid in her mother.     Problem list, medications, and allergies reviewed by myself today in Epic.     Objective:     /66 (BP Location: Right arm, Patient Position: Sitting, BP Cuff Size: Adult)   Pulse (!) 108   Temp 37.1 °C (98.7 °F) (Temporal)   Resp 14   Ht 1.651 m (5' 5\")   Wt 59.1 kg (130 lb 6.4 oz)   SpO2 100%   BMI 21.70 kg/m²     Physical Exam  Vitals reviewed.   Constitutional:       General: She is not in acute distress.     Appearance: Normal appearance. She is not ill-appearing or toxic-appearing.   HENT:      Right Ear: Tympanic membrane normal.      Left Ear: Tympanic membrane " normal.      Nose: Mucosal edema and rhinorrhea present. Rhinorrhea is clear.      Right Sinus: No maxillary sinus tenderness or frontal sinus tenderness.      Left Sinus: No maxillary sinus tenderness or frontal sinus tenderness.      Mouth/Throat:      Mouth: Mucous membranes are moist.      Pharynx: Oropharynx is clear. No oropharyngeal exudate or posterior oropharyngeal erythema.   Eyes:      Pupils: Pupils are equal, round, and reactive to light.   Cardiovascular:      Rate and Rhythm: Normal rate and regular rhythm.      Heart sounds: Normal heart sounds.   Pulmonary:      Effort: Pulmonary effort is normal. No respiratory distress.      Breath sounds: Normal breath sounds. No wheezing, rhonchi or rales.   Musculoskeletal:      Cervical back: Neck supple.   Lymphadenopathy:      Cervical: No cervical adenopathy.   Skin:     General: Skin is warm and dry.   Neurological:      General: No focal deficit present.      Mental Status: She is alert and oriented to person, place, and time.   Psychiatric:         Mood and Affect: Mood normal.         Behavior: Behavior normal.         Diagnosis and associated orders:     1. Acute non-recurrent frontal sinusitis  SARS-CoV-2 PCR (24 hour In-House): Collect NP swab in [facility]        Comments/MDM:     Discussed with patient signs and symptoms most likely are due to a viral etiology at this point in time.   Test for COVID-19. Result will be reviewed by myself. We will call/message back for results and appropriate further instructions. Instructed to sign up for [employer] if they have not already. Result will be automatically released to [employer] application for patient review. I will be sending a message with Next Step Instructions to [employer] soon after resulted.   Symptomatic and supportive care:   Plenty of oral hydration and rest   Over the counter cough suppressant as directed.  Tylenol or ibuprofen for pain and fever as directed.   Warm salt water gargles for sore throat, soft  foods, cool liquids.   Saline nasal spray and Flonase as a decongestant.   Nonsedating oral antihistamine  Infection control measures at home. Stay away from people, Hand washing, covering sneeze/cough, disinfect surfaces.   Remain home from work, school, and other populated environments. Work note provided with information of quarantine measures per CDC guidelines.   Overall, the patient is well-appearing. They are not hypoxic, afebrile, and a normal pulmonary exam.     I personally reviewed prior external notes and test results pertinent to today's visit. Supportive care, natural history, differential diagnoses, and indications for immediate follow-up discussed. Patient expresses understanding and agrees to plan. Patient denies any other questions or concerns.     Follow-up with the primary care physician for recheck, reevaluation, and consideration of further management.    Please note that this dictation was created using voice recognition software. I have made a reasonable attempt to correct obvious errors, but I expect that there are errors of grammar and possibly content that I did not discover before finalizing the note.    This note was electronically signed by Shin Garzon PA-C

## 2021-10-11 DIAGNOSIS — J01.10 ACUTE NON-RECURRENT FRONTAL SINUSITIS: ICD-10-CM

## 2021-10-11 LAB — COVID ORDER STATUS COVID19: NORMAL

## 2021-10-12 LAB
SARS-COV-2 RNA RESP QL NAA+PROBE: NOTDETECTED
SPECIMEN SOURCE: NORMAL

## 2021-10-19 ENCOUNTER — HOSPITAL ENCOUNTER (OUTPATIENT)
Facility: MEDICAL CENTER | Age: 21
End: 2021-10-19
Attending: FAMILY MEDICINE
Payer: COMMERCIAL

## 2021-10-19 ENCOUNTER — OFFICE VISIT (OUTPATIENT)
Dept: MEDICAL GROUP | Facility: PHYSICIAN GROUP | Age: 21
End: 2021-10-19
Payer: COMMERCIAL

## 2021-10-19 VITALS
BODY MASS INDEX: 22.26 KG/M2 | WEIGHT: 133.6 LBS | HEIGHT: 65 IN | DIASTOLIC BLOOD PRESSURE: 60 MMHG | SYSTOLIC BLOOD PRESSURE: 100 MMHG | HEART RATE: 76 BPM | OXYGEN SATURATION: 96 % | TEMPERATURE: 98.8 F

## 2021-10-19 DIAGNOSIS — Z11.3 SCREEN FOR STD (SEXUALLY TRANSMITTED DISEASE): ICD-10-CM

## 2021-10-19 DIAGNOSIS — G44.209 TENSION HEADACHE: ICD-10-CM

## 2021-10-19 DIAGNOSIS — Z00.00 ROUTINE PHYSICAL EXAMINATION: ICD-10-CM

## 2021-10-19 DIAGNOSIS — Z01.419 ENCOUNTER FOR ROUTINE GYNECOLOGICAL EXAMINATION WITH PAPANICOLAOU SMEAR OF CERVIX: ICD-10-CM

## 2021-10-19 PROCEDURE — 99000 SPECIMEN HANDLING OFFICE-LAB: CPT | Performed by: FAMILY MEDICINE

## 2021-10-19 PROCEDURE — 99395 PREV VISIT EST AGE 18-39: CPT | Performed by: FAMILY MEDICINE

## 2021-10-19 PROCEDURE — 88175 CYTOPATH C/V AUTO FLUID REDO: CPT

## 2021-10-19 RX ORDER — BUTALBITAL, ACETAMINOPHEN AND CAFFEINE 300; 40; 50 MG/1; MG/1; MG/1
1 CAPSULE ORAL EVERY 4 HOURS PRN
Qty: 60 CAPSULE | Refills: 1 | Status: SHIPPED
Start: 2021-10-19 | End: 2021-10-25 | Stop reason: ALTCHOICE

## 2021-10-19 ASSESSMENT — LIFESTYLE VARIABLES
DO YOUR FAMILY OR FRIENDS EVER TELL YOU THAT YOU SHOULD CUT DOWN ON YOUR DRINKING OR DRUG USE: NO
HAVE YOU EVER RIDDEN IN A CAR DRIVEN BY SOMEONE WHO WAS HIGH OR HAD BEEN USING ALCOHOL OR DRUGS: NO
DO YOU EVER USE ALCOHOL OR DRUGS TO RELAX, FEEL BETTER ABOUT YOURSELF, OR FIT IN: NO
PART A TOTAL SCORE: 195
DURING THE PAST 12 MONTHS, ON HOW MANY DAYS DID YOU USE ANY TOBACCO OR NICOTINE PRODUCTS: 0
HAVE YOU EVER GOTTEN IN TROUBLE WHILE YOU WERE USING ALCOHOL OR DRUGS: NO
DURING THE PAST 12 MONTHS, ON HOW MANY DAYS DID YOU USE ANYTHING ELSE TO GET HIGH: 0
DURING THE PAST 12 MONTHS, ON HOW MANY DAYS DID YOU USE ANY MARIJUANA: 180
DO YOU EVER USE ALCOHOL OR DRUGS WHILE YOU ARE BY YOURSELF ALONE: NO
DO YOU EVER FORGET THINGS YOU DID WHILE USING ALCOHOL OR DRUGS: NO
DURING THE PAST 12 MONTHS, ON HOW MANY DAYS DID YOU DRINK MORE THAN A FEW SIPS OF BEER, WINE, OR ANY DRINK CONTAINING ALCOHOL: 15

## 2021-10-20 DIAGNOSIS — Z01.419 ENCOUNTER FOR ROUTINE GYNECOLOGICAL EXAMINATION WITH PAPANICOLAOU SMEAR OF CERVIX: ICD-10-CM

## 2021-10-20 LAB — CYTOLOGY REG CYTOL: NORMAL

## 2021-10-20 NOTE — PROGRESS NOTES
Benita Palmer is a 21 y.o. female who presents with Gynecologic Exam (PAP and Gyn)            HPI     Patient is here for routine GYN exam/Pap smear.  This is her first GYN exam/Pap smear.  Patient is  0 para 0.  LMP 2021.  No history of STDs.  She declined flu shot, HPV, second dose of Menactra and COVID-19 vaccinations.  She said she will come in as a walk-in if she decides to get the meningitis shot, HPV shot in the future.    She was on OCP to manage dysmenorrhea.  She decided to discontinue the OCP and she has been off it for a month now.  So far no recurrence of dysmenorrhea.  She is currently in a relationship and she said she will use temperature method to track her ovulation and use rhythm method for contraception at this time.      She was seen at urgent care for vaginal discharge and her vaginal DNA panel came back positive for bacterial vaginosis.  GC and Chlamydia were negative.  She was treated with metronidazole with resolution of the problem.    She needs refill of Fioricet that she takes for tension headache which works very well for her.  She only uses this very rarely and she just ran out of her prescription that was filled more than a year ago.    She wants to be screened for STD.  Her GC and Chlamydia tests came back negative a month ago when she was seen at urgent care for vaginal discharge.  The DNA panel came back positive for bacterial vaginosis for which was treated.  She was negative for trichomonas, candida.    I reviewed the following    Past Medical History:   Diagnosis Date   • Dysmenorrhea    • Recurrent UTI         History reviewed. No pertinent surgical history.    Allergies   Allergen Reactions   • Anti-Itch Rash       Current Outpatient Medications   Medication Sig Dispense Refill   • acetaminophen/caffeine/butalbital 300-40-50 mg (FIORICET) -40 MG Cap capsule Take 1 Capsule by mouth every four hours as needed for Headache for up to 30 days. 60  Capsule 1   • Norgestim-Eth Estrad Triphasic 0.18/0.215/0.25 MG-35 MCG Tab Take 1 tablet by mouth every day. (Patient not taking: Reported on 10/10/2021) 28 tablet 11     No current facility-administered medications for this visit.        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   • Occupation:    Tobacco Use   • Smoking status: Never Smoker   • Smokeless tobacco: Never Used   Vaping Use   • Vaping Use: Never used   Substance and Sexual Activity   • Alcohol use: Not Currently     Alcohol/week: 0.0 oz     Comment: rare   • Drug use: Yes     Types: Marijuana   • Sexual activity: Never   Other Topics Concern   • Behavioral problems Not Asked   • Interpersonal relationships Not Asked   • Sad or not enjoying activities Not Asked   • Suicidal thoughts Not Asked   • Poor school performance Not Asked   • Reading difficulties Not Asked   • Speech difficulties Not Asked   • Writing difficulties Not Asked   • Inadequate sleep Not Asked   • Excessive TV viewing Not Asked   • Excessive video game use Not Asked   • Inadequate exercise Not Asked   • Sports related Not Asked   • Poor diet Not Asked   • Family concerns for drug/alcohol abuse Not Asked   • Poor oral hygiene Not Asked   • Bike safety Not Asked   • Family concerns vehicle safety Not Asked   Social History Narrative   • Not on file     Social Determinants of Health     Financial Resource Strain:    • Difficulty of Paying Living Expenses:    Food Insecurity:   "  • Worried About Running Out of Food in the Last Year:    • Ran Out of Food in the Last Year:    Transportation Needs:    • Lack of Transportation (Medical):    • Lack of Transportation (Non-Medical):    Physical Activity:    • Days of Exercise per Week:    • Minutes of Exercise per Session:    Stress:    • Feeling of Stress :    Social Connections:    • Frequency of Communication with Friends and Family:    • Frequency of Social Gatherings with Friends and Family:    • Attends Sikh Services:    • Active Member of Clubs or Organizations:    • Attends Club or Organization Meetings:    • Marital Status:    Intimate Partner Violence:    • Fear of Current or Ex-Partner:    • Emotionally Abused:    • Physically Abused:    • Sexually Abused:         ROS    As per HPI, the rest are negative.         Objective     /60 (BP Location: Left arm, Patient Position: Sitting, BP Cuff Size: Adult)   Pulse 76   Temp 37.1 °C (98.8 °F) (Temporal)   Ht 1.651 m (5' 5\")   Wt 60.6 kg (133 lb 9.6 oz)   SpO2 96%   BMI 22.23 kg/m²      Physical Exam  Vitals and nursing note reviewed.   Constitutional:       General: She is not in acute distress.     Appearance: She is well-developed. She is not diaphoretic.   HENT:      Head: Normocephalic and atraumatic.      Right Ear: External ear normal.      Left Ear: External ear normal.      Nose: Nose normal.      Mouth/Throat:      Pharynx: No oropharyngeal exudate.   Eyes:      General: No scleral icterus.        Right eye: No discharge.         Left eye: No discharge.      Conjunctiva/sclera: Conjunctivae normal.      Pupils: Pupils are equal, round, and reactive to light.   Neck:      Thyroid: No thyromegaly.      Trachea: No tracheal deviation.   Cardiovascular:      Rate and Rhythm: Normal rate and regular rhythm.      Heart sounds: Normal heart sounds. No murmur heard.   No gallop.    Pulmonary:      Effort: Pulmonary effort is normal. No respiratory distress.      Breath " sounds: Normal breath sounds. No stridor. No wheezing or rales.   Chest:      Breasts: Breasts are symmetrical.         Right: No inverted nipple, mass, nipple discharge, skin change or tenderness.         Left: No inverted nipple, mass, nipple discharge, skin change or tenderness.   Abdominal:      General: Bowel sounds are normal. There is no distension.      Palpations: Abdomen is soft. There is no mass.      Tenderness: There is no abdominal tenderness. There is no guarding or rebound.      Hernia: There is no hernia in the left inguinal area or right inguinal area.   Genitourinary:     General: Normal vulva.      Exam position: Supine.      Labia:         Right: No rash.         Left: No rash.       Vagina: Normal. No vaginal discharge.      Cervix: Friability and cervical bleeding (Minimal bleeding with the brush) present. No cervical motion tenderness or discharge.      Uterus: Not deviated, not enlarged, not fixed and not tender.       Adnexa:         Right: No mass, tenderness or fullness.          Left: No mass, tenderness or fullness.     Musculoskeletal:         General: No tenderness. Normal range of motion.      Cervical back: Normal range of motion and neck supple.   Lymphadenopathy:      Cervical: No cervical adenopathy.      Lower Body: No right inguinal adenopathy. No left inguinal adenopathy.   Skin:     General: Skin is warm.      Coloration: Skin is not pale.      Findings: No erythema or rash.   Neurological:      Mental Status: She is alert and oriented to person, place, and time.      Cranial Nerves: No cranial nerve deficit.      Motor: No abnormal muscle tone.      Coordination: Coordination normal.      Deep Tendon Reflexes: Reflexes normal.   Psychiatric:         Behavior: Behavior normal.         Thought Content: Thought content normal.                                    Assessment & Plan        1. Encounter for routine gynecological examination with Papanicolaou smear of  cervix  Complete exam was done.  Pap smear was done.  Specimen was packaged and sent to the lab.  She declined immunizations at this time but she said she will walk in if she decides to get them in the future.  She is due for HPV vaccination, Menactra #2, flu shot.  She declined COVID-19 vaccination.  - THINPREP PAP ONLY; Future    2. Routine physical examination  Complete physical exam done.  We will send her for screening labs.  - Lipid Profile; Future  - Comp Metabolic Panel; Future  - CBC WITH DIFFERENTIAL; Future    3. Screen for STD (sexually transmitted disease)  She already had GC and Chlamydia tests done in urgent care which came back negative a month ago.  Her trichomonas and candida tests were also negative.  I will go ahead and send her for hepatitis C, syphilis and HIV tests.  - HEP C VIRUS ANTIBODY; Future  - T.PALLIDUM AB EIA; Future  - HIV AG/AB COMBO ASSAY SCREENING; Future    4. Tension headache  I refilled her Fioricet which works well for tension headache.  She only takes this very rarely.  - acetaminophen/caffeine/butalbital 300-40-50 mg (FIORICET) -40 MG Cap capsule; Take 1 Capsule by mouth every four hours as needed for Headache for up to 30 days.  Dispense: 60 Capsule; Refill: 1    Follow-up yearly or sooner if needed.      Please note that this dictation was created using voice recognition software. I have worked with consultants from the vendor as well as technical experts from [facility] to optimize the interface. I have made every reasonable attempt to correct obvious errors, but I expect that there are errors of grammar and possibly content I did not discover before finalizing the note.

## 2021-10-25 ENCOUNTER — TELEPHONE (OUTPATIENT)
Dept: MEDICAL GROUP | Facility: PHYSICIAN GROUP | Age: 21
End: 2021-10-25

## 2021-10-25 DIAGNOSIS — G44.209 TENSION HEADACHE: ICD-10-CM

## 2021-10-25 RX ORDER — BUTALBITAL, ACETAMINOPHEN AND CAFFEINE 50; 325; 40 MG/1; MG/1; MG/1
1 TABLET ORAL EVERY 4 HOURS PRN
Qty: 60 TABLET | Refills: 1 | Status: SHIPPED | OUTPATIENT
Start: 2021-10-25 | End: 2021-11-24

## 2021-10-25 NOTE — TELEPHONE ENCOUNTER
DOCUMENTATION OF PAR STATUS:    1. Name of Medication & Dose: Fioricet    2. Name of Prescription Coverage Company & phone #:[employer] [place]    3. Date Prior Auth Submitted: 10/21/2021    4. What information was given to obtain insurance decision?     5. Prior Auth Status? Denied    6. Patient Notified: no

## 2021-10-25 NOTE — TELEPHONE ENCOUNTER
FINAL PRIOR AUTHORIZATION STATUS:    1.  Name of Medication & Dose: Fioricet     2. Prior Auth Status: Denied.  Reason:  Capsile not coved by insurance    3. Action Taken: Pharmacy Notified: no Patient Notified: no

## 2021-12-30 ENCOUNTER — HOSPITAL ENCOUNTER (OUTPATIENT)
Facility: MEDICAL CENTER | Age: 21
End: 2021-12-30
Attending: STUDENT IN AN ORGANIZED HEALTH CARE EDUCATION/TRAINING PROGRAM
Payer: COMMERCIAL

## 2021-12-30 ENCOUNTER — OFFICE VISIT (OUTPATIENT)
Dept: URGENT CARE | Facility: PHYSICIAN GROUP | Age: 21
End: 2021-12-30
Payer: COMMERCIAL

## 2021-12-30 VITALS
RESPIRATION RATE: 20 BRPM | SYSTOLIC BLOOD PRESSURE: 102 MMHG | DIASTOLIC BLOOD PRESSURE: 62 MMHG | HEART RATE: 113 BPM | TEMPERATURE: 98.7 F | OXYGEN SATURATION: 96 % | WEIGHT: 133 LBS | BODY MASS INDEX: 22.16 KG/M2 | HEIGHT: 65 IN

## 2021-12-30 DIAGNOSIS — N30.00 ACUTE CYSTITIS WITHOUT HEMATURIA: ICD-10-CM

## 2021-12-30 LAB
APPEARANCE UR: CLEAR
BILIRUB UR STRIP-MCNC: NEGATIVE MG/DL
COLOR UR AUTO: NORMAL
GLUCOSE UR STRIP.AUTO-MCNC: NEGATIVE MG/DL
KETONES UR STRIP.AUTO-MCNC: NEGATIVE MG/DL
LEUKOCYTE ESTERASE UR QL STRIP.AUTO: NORMAL
NITRITE UR QL STRIP.AUTO: NEGATIVE
PH UR STRIP.AUTO: 7 [PH] (ref 5–8)
PROT UR QL STRIP: NEGATIVE MG/DL
RBC UR QL AUTO: NORMAL
SP GR UR STRIP.AUTO: 1.01
UROBILINOGEN UR STRIP-MCNC: 0.2 MG/DL

## 2021-12-30 PROCEDURE — 87086 URINE CULTURE/COLONY COUNT: CPT

## 2021-12-30 PROCEDURE — 87186 SC STD MICRODIL/AGAR DIL: CPT

## 2021-12-30 PROCEDURE — 81002 URINALYSIS NONAUTO W/O SCOPE: CPT | Performed by: STUDENT IN AN ORGANIZED HEALTH CARE EDUCATION/TRAINING PROGRAM

## 2021-12-30 PROCEDURE — 99213 OFFICE O/P EST LOW 20 MIN: CPT | Performed by: STUDENT IN AN ORGANIZED HEALTH CARE EDUCATION/TRAINING PROGRAM

## 2021-12-30 PROCEDURE — 87077 CULTURE AEROBIC IDENTIFY: CPT

## 2021-12-30 RX ORDER — NITROFURANTOIN 25; 75 MG/1; MG/1
100 CAPSULE ORAL 2 TIMES DAILY
Qty: 10 CAPSULE | Refills: 0 | Status: SHIPPED | OUTPATIENT
Start: 2021-12-30 | End: 2022-01-04

## 2021-12-31 DIAGNOSIS — N30.00 ACUTE CYSTITIS WITHOUT HEMATURIA: ICD-10-CM

## 2021-12-31 NOTE — PROGRESS NOTES
"Subjective:   CHIEF COMPLAINT  Chief Complaint   Patient presents with   • Painful Urination     cloudy urine w/ back pain and started 12/29. Has history of UTI       HPI  Lizeth Palmer is a 21 y.o. female who presents with a chief complaint of concerns for possible urinary tract infection.  Last night the patient developed malodorous urine with a cloudy appearance.  Says she has an \"uncomfortable feeling\" when urinating but no overt burning with urination.  She has not tried taking any medications.  No hematuria, nausea, vomiting, fevers or chills.  No pruritus or discharge.  Says she has had UTIs in the past and her current symptoms are consistent with a previous UTIs.  Positive ROS for bilateral low back pain.    REVIEW OF SYSTEMS  General: no fever or chills  GI: no nausea or vomiting  See HPI for further details.    PAST MEDICAL HISTORY  Patient Active Problem List    Diagnosis Date Noted   • Epigastric pain 08/01/2018   • Chronic bilateral low back pain without sciatica 08/01/2018   • Migraine with aura and without status migrainosus, not intractable 11/23/2016   • Dysmenorrhea        SURGICAL HISTORY  patient denies any surgical history    ALLERGIES  Allergies   Allergen Reactions   • Anti-Itch Rash       CURRENT MEDICATIONS  Home Medications     Reviewed by Osman Gonzalez, Kole Ass't (Medical Assistant) on 12/30/21 at 1656  Med List Status: <None>   Medication Last Dose Status   Norgestim-Eth Estrad Triphasic 0.18/0.215/0.25 MG-35 MCG Tab Not Taking Flagged for Removal                SOCIAL HISTORY  Social History     Tobacco Use   • Smoking status: Never Smoker   • Smokeless tobacco: Never Used   Vaping Use   • Vaping Use: Never used   Substance and Sexual Activity   • Alcohol use: Not Currently     Alcohol/week: 0.0 oz     Comment: rare   • Drug use: Not Currently   • Sexual activity: Never       FAMILY HISTORY  Family History   Problem Relation Age of Onset   • GI Disease Mother         gerd   • Thyroid " "Mother         hypothyroidism   • GI Disease Father         diverticulosis    • No Known Problems Maternal Grandmother    • No Known Problems Maternal Grandfather           Objective:   PHYSICAL EXAM  VITAL SIGNS: /62 (BP Location: Left arm, Patient Position: Sitting, BP Cuff Size: Adult)   Pulse (!) 113   Temp 37.1 °C (98.7 °F) (Temporal)   Resp 20   Ht 1.651 m (5' 5\")   Wt 60.3 kg (133 lb)   SpO2 96%   BMI 22.13 kg/m²     Gen: no acute distress, normal voice  Skin: dry, intact, moist mucosal membranes  Lungs: CTAB w/ symmetric expansion  CV: RRR w/o murmurs or clicks  : No CVAT bilaterally  Psych: normal affect, normal judgement, alert, awake    UA: Moderate blood.  Moderate leukocytes.  No nitrates.    Assessment/Plan:     1. Acute cystitis without hematuria  POCT Urinalysis    URINE CULTURE(NEW)    nitrofurantoin (MACROBID) 100 MG Cap   Signs symptoms consistent with acute cystitis.  Patient does have a history of recurrent UTIs and feels her current symptoms are consistent with previous infections.  Low back pain appears musculoskeletal in nature highly unlikely this is due to upper genitourinary infection given the lack of systemic symptoms and the nontoxic appearance on examination.  -Ordered Rx for Macrobid  -Ordered urine culture.  Only contact patient if need to change/update the medication at [phone].  Okay to leave voicemail.  -2 L H2O daily  -Follow-up in urgent care as needed.    Differential diagnosis, natural history, supportive care, and indications for immediate follow-up discussed. All questions answered. Patient agrees with the plan of care.    Follow-up as needed if symptoms worsen or fail to improve to PCP, Urgent care or Emergency Room.    Please note that this dictation was created using voice recognition software. I have made a reasonable attempt to correct obvious errors, but I expect that there are errors of grammar and possibly content that I did not discover before " finalizing the note.

## 2022-01-02 LAB
BACTERIA UR CULT: ABNORMAL
BACTERIA UR CULT: ABNORMAL
SIGNIFICANT IND 70042: ABNORMAL
SITE SITE: ABNORMAL
SOURCE SOURCE: ABNORMAL

## 2022-10-11 ENCOUNTER — OFFICE VISIT (OUTPATIENT)
Dept: MEDICAL GROUP | Facility: PHYSICIAN GROUP | Age: 22
End: 2022-10-11
Payer: COMMERCIAL

## 2022-10-11 VITALS
TEMPERATURE: 99.2 F | WEIGHT: 138.6 LBS | HEIGHT: 65 IN | HEART RATE: 69 BPM | RESPIRATION RATE: 16 BRPM | SYSTOLIC BLOOD PRESSURE: 98 MMHG | BODY MASS INDEX: 23.09 KG/M2 | DIASTOLIC BLOOD PRESSURE: 56 MMHG | OXYGEN SATURATION: 99 %

## 2022-10-11 DIAGNOSIS — K45.8 OTHER SPECIFIED ABDOMINAL HERNIA WITHOUT OBSTRUCTION OR GANGRENE: ICD-10-CM

## 2022-10-11 DIAGNOSIS — Z00.00 ENCOUNTER FOR MEDICAL EXAMINATION TO ESTABLISH CARE: ICD-10-CM

## 2022-10-11 PROBLEM — N39.0 RECURRENT UTI: Status: ACTIVE | Noted: 2022-10-11

## 2022-10-11 PROCEDURE — 99213 OFFICE O/P EST LOW 20 MIN: CPT

## 2022-10-11 ASSESSMENT — PATIENT HEALTH QUESTIONNAIRE - PHQ9: CLINICAL INTERPRETATION OF PHQ2 SCORE: 0

## 2022-10-11 NOTE — PROGRESS NOTES
"CC:   Chief Complaint   Patient presents with    [facility]        HISTORY OF PRESENT ILLNESS: Patient is a 22 y.o. female established patient who presents today to discuss the following problems below:     Migraine with aura and without status migrainosus, not intractable  History of occasional migraines, well controlled at this time. No recurrence in  years    Recurrent UTI  Patient reports that she had multiple UTIs in middle school that did not respond to antibiotics.  She was evaluated by a specialist and placed on a longer round of antibiotics.  She has had a couple of urinary tract infection since that time, but no significant recurrence.     Other specified abdominal hernia without obstruction or gangrene  Patient reports that when she works out significantly doing ab exercises, she gets a porch in her abdomen that she is able to reduce manually.  It is not painful.It is not enlarging.  She reports it is about the size of a golf ball and has been stable for many years.      Past Medical History:   Diagnosis Date    Dysmenorrhea     Recurrent UTI        Allergies:Anti-itch    Review of Systems: Otherwise negative except for as stated above.      Exam: BP (!) 98/56 (BP Location: Right arm, Patient Position: Sitting, BP Cuff Size: Adult)   Pulse 69   Temp 37.3 °C (99.2 °F) (Temporal)   Resp 16   Ht 1.651 m (5' 5\")   Wt 62.9 kg (138 lb 9.6 oz)   SpO2 99%  Body mass index is 23.06 kg/m².    Gen: Alert and oriented x4. Well developed, well-nourished female in no apparent distress.  Skin: Warm, dry, good turgor, no rashes in visible areas or lacerations appreciated.   Eye: EOM intact, pupils equal, round and reactive, conjunctiva clear, lids normal.  Neck: Trachea midline, no masses, no thyromegaly  Lungs: Normal effort, CTA bilaterally, no wheezes, rhonchi, or rales. No stridor or audible wheezing. Equal chest expansion.   CV: Regular rate and rhythm. No murmurs, rubs, or gallops.  GI:  Soft, non-tender " abdomen with no distention.   MSK: Normal gait, moves all extremities.  Neuro: Alert and oriented x 4, non-focal exam with motor and sensory grossly intact.  Ext: No clubbing, cyanosis, edema.  Psych: Normal behavior, affect and mood.      Assessment/Plan:  22 y.o. female with the following -    1. Encounter for medical examination to establish care  - Comp Metabolic Panel; Future  - CBC WITHOUT DIFFERENTIAL; Future  - TSH WITH REFLEX TO FT4; Future  - VITAMIN D,25 HYDROXY (DEFICIENCY); Future    2. Other specified abdominal hernia without obstruction or gangrene  Chronic condition, stable.  Discussed with patient that as her hernia only occurs once every few months, and is reducible then I would not recommend that she pursue surgical intervention at this point.  Continue to monitor, if hernia becomes irreducible or symptomatic, consider imaging and for referral to general surgery    Follow-up: Return in about 1 year (around 10/11/2023) for annual exam .    Health Maintenance: Completed. Declines vaccines      Please note that this dictation was created using voice recognition software. I have made every reasonable attempt to correct obvious errors, but I expect that there are errors of grammar and possibly content that I did not discover before finalizing the note.    Electronically signed by FORD Lagos on October 11, 2022

## 2022-10-11 NOTE — ASSESSMENT & PLAN NOTE
Patient reports that when she works out significantly doing ab exercises, she gets a porch in her abdomen that she is able to reduce manually.  It is not painful.It is not enlarging.  She reports it is about the size of a golf ball and has been stable for many years.

## 2022-10-11 NOTE — ASSESSMENT & PLAN NOTE
Patient reports that she had multiple UTIs in middle school that did not respond to antibiotics.  She was evaluated by a specialist and placed on a longer round of antibiotics.  She has had a couple of urinary tract infection since that time, but no significant recurrence.

## 2023-01-13 ENCOUNTER — OFFICE VISIT (OUTPATIENT)
Dept: URGENT CARE | Facility: PHYSICIAN GROUP | Age: 23
End: 2023-01-13
Payer: COMMERCIAL

## 2023-01-13 VITALS
WEIGHT: 135 LBS | SYSTOLIC BLOOD PRESSURE: 120 MMHG | RESPIRATION RATE: 18 BRPM | HEART RATE: 72 BPM | OXYGEN SATURATION: 97 % | BODY MASS INDEX: 22.49 KG/M2 | DIASTOLIC BLOOD PRESSURE: 78 MMHG | TEMPERATURE: 97.9 F | HEIGHT: 65 IN

## 2023-01-13 DIAGNOSIS — J01.40 ACUTE NON-RECURRENT PANSINUSITIS: ICD-10-CM

## 2023-01-13 PROCEDURE — 99213 OFFICE O/P EST LOW 20 MIN: CPT | Performed by: PHYSICIAN ASSISTANT

## 2023-01-13 RX ORDER — AMOXICILLIN AND CLAVULANATE POTASSIUM 875; 125 MG/1; MG/1
1 TABLET, FILM COATED ORAL 2 TIMES DAILY
Qty: 14 TABLET | Refills: 0 | Status: SHIPPED | OUTPATIENT
Start: 2023-01-13 | End: 2023-01-20

## 2023-01-13 ASSESSMENT — ENCOUNTER SYMPTOMS
COUGH: 1
SINUS PAIN: 1
SHORTNESS OF BREATH: 0
SPUTUM PRODUCTION: 1
FEVER: 0
PALPITATIONS: 0
HEMOPTYSIS: 0
CHILLS: 0

## 2023-01-13 NOTE — PROGRESS NOTES
"  Subjective:   Lizeth Palmer is a 23 y.o. female who presents today with   Chief Complaint   Patient presents with    Cough     Stuffy nose since 12/26/2022       Cough  This is a new problem. The current episode started 1 to 4 weeks ago. The problem has been unchanged. The problem occurs every few minutes. Pertinent negatives include no chest pain, chills, fever, hemoptysis or shortness of breath. Treatments tried: mucinex. The treatment provided mild relief.     PMH:  has a past medical history of Dysmenorrhea and Recurrent UTI.    She has no past medical history of Anemia, ASTHMA, or Diabetes.  MEDS:   Current Outpatient Medications:     amoxicillin-clavulanate (AUGMENTIN) 875-125 MG Tab, Take 1 Tablet by mouth 2 times a day for 7 days., Disp: 14 Tablet, Rfl: 0  ALLERGIES:   Allergies   Allergen Reactions    Anti-Itch Rash     SURGHX: No past surgical history on file.  SOCHX:  reports that she has never smoked. She has never used smokeless tobacco. She reports that she does not currently use alcohol. She reports that she does not currently use drugs.  FH: Reviewed with patient, not pertinent to this visit.       Review of Systems   Constitutional:  Negative for chills and fever.   HENT:  Positive for congestion and sinus pain.    Respiratory:  Positive for cough and sputum production. Negative for hemoptysis and shortness of breath.    Cardiovascular:  Negative for chest pain and palpitations.      Objective:   /78   Pulse 72   Temp 36.6 °C (97.9 °F) (Temporal)   Resp 18   Ht 1.651 m (5' 5\")   Wt 61.2 kg (135 lb)   SpO2 97%   BMI 22.47 kg/m²   Physical Exam  Vitals and nursing note reviewed.   Constitutional:       General: She is not in acute distress.     Appearance: Normal appearance. She is well-developed. She is not ill-appearing or toxic-appearing.   HENT:      Head: Normocephalic and atraumatic.      Right Ear: Hearing, tympanic membrane and ear canal normal.      Left Ear: Hearing, tympanic " membrane and ear canal normal.      Nose: Mucosal edema and congestion present.      Mouth/Throat:      Mouth: Mucous membranes are moist.      Pharynx: No oropharyngeal exudate or posterior oropharyngeal erythema.   Cardiovascular:      Rate and Rhythm: Normal rate and regular rhythm.      Heart sounds: Normal heart sounds.   Pulmonary:      Effort: Pulmonary effort is normal.      Breath sounds: Normal breath sounds. No stridor. No wheezing, rhonchi or rales.   Musculoskeletal:      Comments: Normal movement in all 4 extremities   Skin:     General: Skin is warm and dry.   Neurological:      Mental Status: She is alert.      Coordination: Coordination normal.   Psychiatric:         Mood and Affect: Mood normal.         Assessment/Plan:   Assessment    1. Acute non-recurrent pansinusitis  - amoxicillin-clavulanate (AUGMENTIN) 875-125 MG Tab; Take 1 Tablet by mouth 2 times a day for 7 days.  Dispense: 14 Tablet; Refill: 0  Given symptoms and presentation I would recommend treating with antibiotics based on duration and persistence of symptoms for potential of bacterial infection.  Patient is agreeable with this plan.  Symptoms are consistent with sinus infection.  Discussed use of over-the-counter sinus rinse and nasal sprays as well.  Differential diagnosis, natural history, supportive care, and indications for immediate follow-up discussed.   Patient given instructions and understanding of medications and treatment.    If not improving in 3-5 days, F/U with PCP or return to  if symptoms worsen.    Patient agreeable to plan.        Please note that this dictation was created using voice recognition software. I have made every reasonable attempt to correct obvious errors, but I expect that there are errors of grammar and possibly content that I did not discover before finalizing the note.    Dayo Ramírez PA-C

## 2023-02-02 ENCOUNTER — OFFICE VISIT (OUTPATIENT)
Dept: URGENT CARE | Facility: PHYSICIAN GROUP | Age: 23
End: 2023-02-02
Payer: COMMERCIAL

## 2023-02-02 VITALS
HEART RATE: 78 BPM | OXYGEN SATURATION: 99 % | RESPIRATION RATE: 16 BRPM | HEIGHT: 65 IN | BODY MASS INDEX: 22.82 KG/M2 | WEIGHT: 137 LBS | DIASTOLIC BLOOD PRESSURE: 70 MMHG | TEMPERATURE: 97.5 F | SYSTOLIC BLOOD PRESSURE: 116 MMHG

## 2023-02-02 DIAGNOSIS — J32.9 RECURRENT SINUSITIS: ICD-10-CM

## 2023-02-02 PROCEDURE — 99213 OFFICE O/P EST LOW 20 MIN: CPT | Performed by: PHYSICIAN ASSISTANT

## 2023-02-02 RX ORDER — DOXYCYCLINE HYCLATE 100 MG
100 TABLET ORAL 2 TIMES DAILY
Qty: 14 TABLET | Refills: 0 | Status: SHIPPED | OUTPATIENT
Start: 2023-02-02 | End: 2023-02-09

## 2023-02-02 RX ORDER — METHYLPREDNISOLONE 4 MG/1
TABLET ORAL
Qty: 21 TABLET | Refills: 0 | Status: SHIPPED
Start: 2023-02-02 | End: 2023-02-20

## 2023-02-02 ASSESSMENT — ENCOUNTER SYMPTOMS
EYE DISCHARGE: 0
COUGH: 0
HEADACHES: 1
SINUS PRESSURE: 1
NAUSEA: 0
FEVER: 0
SHORTNESS OF BREATH: 0
SORE THROAT: 0
MYALGIAS: 0
DIARRHEA: 0
EYE REDNESS: 0
VOMITING: 0

## 2023-02-02 NOTE — PROGRESS NOTES
Subjective     Lizeth Palmer is a 23 y.o. female who presents with Nasal Congestion and Sinusitis (Pt states she was here got antibiotics and it helped but now she feels back where she started. )            Sinusitis  This is a recurrent problem. Episode onset: x 3 weeks ago. The problem is unchanged. There has been no fever. Associated symptoms include congestion, headaches and sinus pressure. Pertinent negatives include no coughing, ear pain, shortness of breath or sore throat. Treatments tried: OTC Flonase and OTC Claritin.     The patient states she was recently seen in urgent care on 1/13/2023 and diagnosed with a sinus infection.  The patient states she was prescribed antibiotics at that time.  The patient states her symptoms improved while taking the antibiotics, but states she developed recurrent symptoms upon completing the previously prescribed antibiotics.  The patient states her symptoms have gradually worsened, became more severe over the past 2-3 days.    PMH:  has a past medical history of Dysmenorrhea and Recurrent UTI.    She has no past medical history of Anemia, ASTHMA, or Diabetes.  MEDS: No current outpatient medications on file.  ALLERGIES:   Allergies   Allergen Reactions    Anti-Itch Rash     SURGHX: History reviewed. No pertinent surgical history.  SOCHX:  reports that she has never smoked. She has never used smokeless tobacco. She reports that she does not currently use alcohol. She reports that she does not currently use drugs.  FH: Family history was reviewed, no pertinent findings to report    Review of Systems   Constitutional:  Negative for fever.   HENT:  Positive for congestion and sinus pressure. Negative for ear pain and sore throat.    Eyes:  Negative for discharge and redness.   Respiratory:  Negative for cough and shortness of breath.    Gastrointestinal:  Negative for diarrhea, nausea and vomiting.   Musculoskeletal:  Negative for myalgias.   Neurological:  Positive for  "headaches.            Objective     /70 (BP Location: Right arm, Patient Position: Sitting, BP Cuff Size: Adult long)   Pulse 78   Temp 36.4 °C (97.5 °F) (Temporal)   Resp 16   Ht 1.651 m (5' 5\")   Wt 62.1 kg (137 lb)   SpO2 99%   BMI 22.80 kg/m²      Physical Exam  Constitutional:       General: She is not in acute distress.     Appearance: Normal appearance. She is not ill-appearing.   HENT:      Head: Normocephalic and atraumatic.      Right Ear: External ear normal.      Left Ear: External ear normal.      Nose: Nose normal.      Mouth/Throat:      Mouth: Mucous membranes are moist.      Pharynx: Oropharynx is clear. No posterior oropharyngeal erythema.   Eyes:      Extraocular Movements: Extraocular movements intact.      Conjunctiva/sclera: Conjunctivae normal.   Cardiovascular:      Rate and Rhythm: Normal rate and regular rhythm.      Heart sounds: Normal heart sounds.   Pulmonary:      Effort: Pulmonary effort is normal. No respiratory distress.      Breath sounds: Normal breath sounds. No wheezing.   Musculoskeletal:         General: Normal range of motion.      Cervical back: Normal range of motion and neck supple.   Skin:     General: Skin is warm and dry.   Neurological:      Mental Status: She is alert and oriented to person, place, and time.                       Assessment & Plan      1. Recurrent sinusitis  - doxycycline (VIBRAMYCIN) 100 MG Tab; Take 1 Tablet by mouth 2 times a day for 7 days.  Dispense: 14 Tablet; Refill: 0  - methylPREDNISolone (MEDROL DOSEPAK) 4 MG Tablet Therapy Pack; Follow schedule on package instructions.  Dispense: 21 Tablet; Refill: 0    The patient's presenting symptoms and physical exam findings are consistent with recurrent sinusitis.  Discussed possible inflammation versus infection.  Will prescribe the patient a Medrol Dosepak for her current symptoms.  We will also prescribe the patient a contingent antibiotic to take upon meeting the criteria discussed.  " Advised the patient to monitor for worsening signs or symptoms.  Recommend OTC medications and supportive care for symptomatic management.  Recommend patient follow-up with PCP.  Discussed return precautions with the patient, and she verbalized understanding.    Differential diagnoses, supportive care, and indications for immediate follow-up discussed with patient.   Instructed to return to clinic or nearest emergency department for any change in condition, further concerns, or worsening of symptoms.    OTC Tylenol or Motrin for fever/discomfort.  OTC cough/cold medication for symptomatic relief  OTC antihistamines for symptomatic relief  OTC Flonase for symptomatic relief  OTC oral decongestants for symptomatic relief  OTC Supportive Care for Congestion - saline nasal spray or neti pot  Drink plenty of fluids  Follow-up with PCP  Return to clinic or go to the ED if symptoms worsen or fail to improve, or if the patient should develop worsening/increasing sinus pain/pressure, congestion, ear pain, sore throat, headache, cough, shortness of breath, wheezing, chest pain, fever/chills, and/or any concerning symptoms.    Discussed plan with the patient, and she agrees to the above.     I personally reviewed prior external notes and test results pertinent to today's visit.  I have independently reviewed and interpreted all diagnostics ordered during this urgent care visit.     Please note that this dictation was created using voice recognition software. I have made every reasonable attempt to correct obvious errors, but I expect that there may be errors of grammar and possibly content that I did not discover before finalizing the note.     This note was electronically signed by Dora Mccormick PA-C

## 2023-02-20 ENCOUNTER — OFFICE VISIT (OUTPATIENT)
Dept: URGENT CARE | Facility: PHYSICIAN GROUP | Age: 23
End: 2023-02-20
Payer: COMMERCIAL

## 2023-02-20 VITALS
BODY MASS INDEX: 22.02 KG/M2 | WEIGHT: 137 LBS | SYSTOLIC BLOOD PRESSURE: 104 MMHG | TEMPERATURE: 99.2 F | RESPIRATION RATE: 16 BRPM | HEIGHT: 66 IN | DIASTOLIC BLOOD PRESSURE: 60 MMHG | OXYGEN SATURATION: 100 % | HEART RATE: 88 BPM

## 2023-02-20 DIAGNOSIS — B37.31 VAGINA, CANDIDIASIS: ICD-10-CM

## 2023-02-20 DIAGNOSIS — R30.0 DYSURIA: ICD-10-CM

## 2023-02-20 LAB
APPEARANCE UR: CLEAR
BILIRUB UR STRIP-MCNC: NEGATIVE MG/DL
COLOR UR AUTO: NORMAL
GLUCOSE UR STRIP.AUTO-MCNC: NEGATIVE MG/DL
KETONES UR STRIP.AUTO-MCNC: NEGATIVE MG/DL
LEUKOCYTE ESTERASE UR QL STRIP.AUTO: NORMAL
NITRITE UR QL STRIP.AUTO: NEGATIVE
PH UR STRIP.AUTO: 6.5 [PH] (ref 5–8)
POCT INT CON NEG: NEGATIVE
POCT INT CON POS: POSITIVE
POCT URINE PREGNANCY TEST: NEGATIVE
PROT UR QL STRIP: NEGATIVE MG/DL
RBC UR QL AUTO: NORMAL
SP GR UR STRIP.AUTO: 1.01
UROBILINOGEN UR STRIP-MCNC: 0.2 MG/DL

## 2023-02-20 PROCEDURE — 81002 URINALYSIS NONAUTO W/O SCOPE: CPT

## 2023-02-20 PROCEDURE — 81025 URINE PREGNANCY TEST: CPT

## 2023-02-20 PROCEDURE — 99213 OFFICE O/P EST LOW 20 MIN: CPT

## 2023-02-20 RX ORDER — FLUCONAZOLE 150 MG/1
150 TABLET ORAL DAILY
Qty: 2 TABLET | Refills: 0 | Status: SHIPPED | OUTPATIENT
Start: 2023-02-20 | End: 2023-02-22

## 2023-02-20 ASSESSMENT — ENCOUNTER SYMPTOMS
FEVER: 0
VOMITING: 0
CHILLS: 0
FLANK PAIN: 0
NAUSEA: 0
ABDOMINAL PAIN: 0

## 2023-02-20 NOTE — PROGRESS NOTES
"Subjective     Lizeth Palmer is a 23 y.o. female who presents with Yeast Infection (X 6 days with itching and been getting worse last couple of days with burning when peeing. )            HPI    Patient presents with symptoms for 6 days now.  She endorses vaginal itching.  She reports burning sensation when urinating.  She reports accompanying white cottage cheeselike discharge.  She denies any foul-smelling discharge.  She reports taking Monistat 1 dose intravaginally, felt like this worsened her symptoms.  She denies any urinary frequency, urinary urgency, hematuria, or flank pain.  She denies any fevers or chills.  Further denies any nausea vomiting, or abdominal pain.  Patient reports taking a course of antibiotics back in January.    Patient's current problem list, medications, and past medical/surgical history were reviewed in Epic.    PMH:  has a past medical history of Dysmenorrhea and Recurrent UTI.    She has no past medical history of Anemia, ASTHMA, or Diabetes.  MEDS:   Current Outpatient Medications:     miconazole (MICOTIN) 2 % Cream, Apply  topically 2 times a day., Disp: , Rfl:   ALLERGIES:   Allergies   Allergen Reactions    Anti-Itch Rash     SURGHX: No past surgical history on file.  SOCHX:  reports that she has never smoked. She has never used smokeless tobacco. She reports that she does not currently use alcohol. She reports that she does not currently use drugs.  FH: Reviewed with patient, not pertinent to this visit.       Review of Systems   Constitutional:  Negative for chills and fever.   Gastrointestinal:  Negative for abdominal pain, nausea and vomiting.   Genitourinary:  Positive for dysuria. Negative for flank pain, frequency, hematuria and urgency.   All other systems reviewed and are negative.           Objective     /60 (BP Location: Left arm, Patient Position: Sitting, BP Cuff Size: Adult)   Pulse 88   Temp 37.3 °C (99.2 °F) (Temporal)   Resp 16   Ht 1.676 m (5' 6\")   " Wt 62.1 kg (137 lb)   SpO2 100%   BMI 22.11 kg/m²      Physical Exam  Constitutional:       Appearance: Normal appearance.   HENT:      Head: Normocephalic.      Nose: Nose normal.   Eyes:      Extraocular Movements: Extraocular movements intact.   Cardiovascular:      Rate and Rhythm: Normal rate.      Pulses: Normal pulses.   Pulmonary:      Effort: Pulmonary effort is normal.   Abdominal:      General: Abdomen is flat.      Palpations: Abdomen is soft.      Tenderness: There is no abdominal tenderness.   Musculoskeletal:         General: Normal range of motion.      Cervical back: Normal range of motion.   Skin:     General: Skin is warm.   Neurological:      General: No focal deficit present.      Mental Status: She is alert.   Psychiatric:         Mood and Affect: Mood normal.         Behavior: Behavior normal.          Assessment & Plan        1. Vagina, candidiasis    - fluconazole (DIFLUCAN) 150 MG tablet; Take 1 Tablet by mouth every day for 2 days.  Dispense: 2 Tablet; Refill: 0    2. Dysuria    - POCT Urinalysis  - POCT Pregnancy         Patient's urinalysis is unremarkable.  Her pregnancy test is negative.  Her presentation is consistent with vaginal candidiasis.  She is prescribed Diflucan 1 tablet for 1 dose.  May repeat dose in a week if still symptomatic.  Recommended taking probiotics with antibiotics to prevent adverse reactions such as diarrhea and yeast infection. Discussed treatment plan with patient, she is agreeable and verbalized understanding.  Educated patient on signs and symptoms watch out for, when to return to the clinic or go to the ER.    Electronically Signed by NIKKI Ley

## 2023-03-28 ENCOUNTER — HOSPITAL ENCOUNTER (OUTPATIENT)
Facility: MEDICAL CENTER | Age: 23
End: 2023-03-28
Attending: NURSE PRACTITIONER
Payer: COMMERCIAL

## 2023-03-28 ENCOUNTER — OFFICE VISIT (OUTPATIENT)
Dept: URGENT CARE | Facility: PHYSICIAN GROUP | Age: 23
End: 2023-03-28
Payer: COMMERCIAL

## 2023-03-28 VITALS
RESPIRATION RATE: 14 BRPM | HEIGHT: 66 IN | OXYGEN SATURATION: 97 % | SYSTOLIC BLOOD PRESSURE: 124 MMHG | BODY MASS INDEX: 21.97 KG/M2 | DIASTOLIC BLOOD PRESSURE: 60 MMHG | HEART RATE: 96 BPM | WEIGHT: 136.69 LBS | TEMPERATURE: 98.8 F

## 2023-03-28 DIAGNOSIS — R82.998 LEUKOCYTES IN URINE: ICD-10-CM

## 2023-03-28 DIAGNOSIS — N89.8 VAGINAL ODOR: ICD-10-CM

## 2023-03-28 LAB
APPEARANCE UR: NORMAL
BILIRUB UR STRIP-MCNC: NEGATIVE MG/DL
COLOR UR AUTO: YELLOW
GLUCOSE UR STRIP.AUTO-MCNC: NEGATIVE MG/DL
KETONES UR STRIP.AUTO-MCNC: NEGATIVE MG/DL
LEUKOCYTE ESTERASE UR QL STRIP.AUTO: NORMAL
NITRITE UR QL STRIP.AUTO: NEGATIVE
PH UR STRIP.AUTO: 7 [PH] (ref 5–8)
POCT INT CON NEG: NEGATIVE
POCT INT CON POS: POSITIVE
POCT URINE PREGNANCY TEST: NEGATIVE
PROT UR QL STRIP: NEGATIVE MG/DL
RBC UR QL AUTO: NORMAL
SP GR UR STRIP.AUTO: 1.01
UROBILINOGEN UR STRIP-MCNC: 0.2 MG/DL

## 2023-03-28 PROCEDURE — 87186 SC STD MICRODIL/AGAR DIL: CPT

## 2023-03-28 PROCEDURE — 81025 URINE PREGNANCY TEST: CPT | Performed by: NURSE PRACTITIONER

## 2023-03-28 PROCEDURE — 87077 CULTURE AEROBIC IDENTIFY: CPT

## 2023-03-28 PROCEDURE — 99214 OFFICE O/P EST MOD 30 MIN: CPT | Performed by: NURSE PRACTITIONER

## 2023-03-28 PROCEDURE — 87086 URINE CULTURE/COLONY COUNT: CPT

## 2023-03-28 PROCEDURE — 87660 TRICHOMONAS VAGIN DIR PROBE: CPT

## 2023-03-28 PROCEDURE — 87480 CANDIDA DNA DIR PROBE: CPT

## 2023-03-28 PROCEDURE — 81002 URINALYSIS NONAUTO W/O SCOPE: CPT | Performed by: NURSE PRACTITIONER

## 2023-03-28 PROCEDURE — 87510 GARDNER VAG DNA DIR PROBE: CPT

## 2023-03-29 DIAGNOSIS — N89.8 VAGINAL ODOR: ICD-10-CM

## 2023-03-29 DIAGNOSIS — R82.998 LEUKOCYTES IN URINE: ICD-10-CM

## 2023-03-29 LAB
CANDIDA DNA VAG QL PROBE+SIG AMP: NEGATIVE
G VAGINALIS DNA VAG QL PROBE+SIG AMP: NEGATIVE
T VAGINALIS DNA VAG QL PROBE+SIG AMP: NEGATIVE

## 2023-03-29 NOTE — PROGRESS NOTES
Lizeth Palmer is a 23 y.o. female who presents for Vaginal Itching (Yesterday itching started/Noticeable odor today/Slight burning when urinating/Previous yeast infection)      HPI  This is a new problem. Lizeth Palmer is a 23 y.o. patient who presents to urgent care with c/o: 2 days of vaginal itching and a noticeable odor when she is urinating.  She has slight burning with urination.  She recently had a urinary tract infection as well as a yeast infection.  This feels slightly different.  She has a history of BV and this feels similar to that.  She is sexually active with a male partner.  She uses condoms.  She has not on other BCM.  Treatments tried: None  Denies abdominal pain, nausea, vomiting, flank pain  No other aggravating or alleviating factors.       ROS See HPI    Allergies:       Allergies   Allergen Reactions    Anti-Itch Rash       PMSFS Hx:  Past Medical History:   Diagnosis Date    Dysmenorrhea     Recurrent UTI      No past surgical history on file.  Family History   Problem Relation Age of Onset    GI Disease Mother         gerd    Thyroid Mother         hypothyroidism    GI Disease Father         diverticulosis     No Known Problems Maternal Grandmother     No Known Problems Maternal Grandfather      Social History     Tobacco Use    Smoking status: Never    Smokeless tobacco: Never   Substance Use Topics    Alcohol use: Not Currently     Alcohol/week: 0.0 oz     Comment: rare       Problems:   Patient Active Problem List   Diagnosis    Dysmenorrhea    Migraine with aura and without status migrainosus, not intractable    Epigastric pain    Chronic bilateral low back pain without sciatica    Recurrent UTI    Other specified abdominal hernia without obstruction or gangrene       Medications:   No current outpatient medications on file prior to visit.     No current facility-administered medications on file prior to visit.          Objective:     /60 (BP Location: Right arm, Patient  "Position: Sitting, BP Cuff Size: Adult long)   Pulse 96   Temp 37.1 °C (98.8 °F) (Temporal)   Resp 14   Ht 1.676 m (5' 6\")   Wt 62 kg (136 lb 11 oz)   SpO2 97%   BMI 22.06 kg/m²     Physical Exam  Vitals and nursing note reviewed.   Constitutional:       General: She is not in acute distress.     Appearance: Normal appearance. She is well-developed. She is not ill-appearing or toxic-appearing.   Cardiovascular:      Rate and Rhythm: Normal rate and regular rhythm.   Pulmonary:      Effort: Pulmonary effort is normal.   Abdominal:      Palpations: Abdomen is not rigid.      Tenderness: There is no right CVA tenderness or left CVA tenderness.   Musculoskeletal:      Lumbar back: Normal.   Skin:     General: Skin is warm and dry.      Capillary Refill: Capillary refill takes less than 2 seconds.   Neurological:      Mental Status: She is alert and oriented to person, place, and time.   Psychiatric:         Mood and Affect: Mood normal.         Behavior: Behavior normal. Behavior is cooperative.         Thought Content: Thought content normal.     Results for orders placed or performed in visit on 03/28/23   POCT Urinalysis   Result Value Ref Range    POC Color yellow Negative    POC Appearance cloudy Negative    POC Glucose negative Negative mg/dL    POC Bilirubin negative Negative mg/dL    POC Ketones negative Negative mg/dL    POC Specific Gravity 1.015     POC Blood trace Negative    POC Urine PH 7.0 5.0 - 8.0    POC Protein negative Negative mg/dL    POC Urobiligen 0.2 mg/dL    POC Nitrites negative Negative    POC Leukocyte Esterase large Negative   POCT Pregnancy   Result Value Ref Range    POC Urine Pregnancy Test Negative     Internal Control Positive Positive     Internal Control Negative Negative          Assessment /Associated Orders:      1. Vaginal odor  VAGINAL PATHOGENS DNA PANEL    POCT Urinalysis    POCT Pregnancy      2. Leukocytes in urine  URINE CULTURE(NEW)          Medical Decision Making:  "   Pt is clinically stable at today's acute urgent care visit.  No acute distress noted. Appropriate for outpatient care at this time.   Acute problem today with uncertain prognosis.   Urine culture: pending   Vag path pending  No evidence of acute cystitis today.  We will treat according to vaginal pathogen and urine culture when results are available. Results will release via [employer].   Recommend consideration of probiotics.  Recommend follow-up with PCP or GYN for wellness exams.    Discussed Dx, management options (risks,benefits, and alternatives to planned treatment), natural progression and supportive care.  Expressed understanding and the treatment plan was agreed upon.   Questions were encouraged and answered   Return to urgent care prn if new or worsening sx or if there is no improvement in condition prn.      Time I spent evaluating Lizeth Palmer in urgent care today was 31  minutes. This time includes preparing for visit, reviewing any pertinent notes or test results, counseling/education, exam, obtaining HPI, interpretation of lab tests, medication management and documentation as indicated above.Time does not include separately billable procedures noted .       Please note that this dictation was created using voice recognition software. I have worked with consultants from the vendor as well as technical experts from [facility] to optimize the interface. I have made every reasonable attempt to correct obvious errors, but I expect that there are errors of grammar and possibly content that I did not discover before finalizing the note.  This note was electronically signed by provider

## 2023-03-30 DIAGNOSIS — N30.00 ACUTE CYSTITIS WITHOUT HEMATURIA: ICD-10-CM

## 2023-03-30 RX ORDER — NITROFURANTOIN 25; 75 MG/1; MG/1
100 CAPSULE ORAL 2 TIMES DAILY
Qty: 10 CAPSULE | Refills: 0 | Status: SHIPPED | OUTPATIENT
Start: 2023-03-30 | End: 2023-04-04

## 2023-03-30 RX ORDER — PHENAZOPYRIDINE HYDROCHLORIDE 200 MG/1
200 TABLET, FILM COATED ORAL 3 TIMES DAILY PRN
Qty: 6 TABLET | Refills: 0 | Status: SHIPPED
Start: 2023-03-30 | End: 2023-06-21

## 2023-06-21 ENCOUNTER — OFFICE VISIT (OUTPATIENT)
Dept: MEDICAL GROUP | Facility: PHYSICIAN GROUP | Age: 23
End: 2023-06-21
Payer: COMMERCIAL

## 2023-06-21 ENCOUNTER — HOSPITAL ENCOUNTER (OUTPATIENT)
Facility: MEDICAL CENTER | Age: 23
End: 2023-06-21
Payer: COMMERCIAL

## 2023-06-21 VITALS
BODY MASS INDEX: 22.42 KG/M2 | WEIGHT: 139.5 LBS | HEIGHT: 66 IN | DIASTOLIC BLOOD PRESSURE: 68 MMHG | TEMPERATURE: 98.4 F | OXYGEN SATURATION: 100 % | RESPIRATION RATE: 16 BRPM | SYSTOLIC BLOOD PRESSURE: 106 MMHG | HEART RATE: 61 BPM

## 2023-06-21 DIAGNOSIS — Z11.3 SCREEN FOR SEXUALLY TRANSMITTED DISEASES: ICD-10-CM

## 2023-06-21 DIAGNOSIS — R30.0 DYSURIA: ICD-10-CM

## 2023-06-21 DIAGNOSIS — Z76.89 ENCOUNTER TO ESTABLISH CARE: ICD-10-CM

## 2023-06-21 DIAGNOSIS — N39.0 RECURRENT UTI: ICD-10-CM

## 2023-06-21 LAB
APPEARANCE UR: CLEAR
BILIRUB UR STRIP-MCNC: NEGATIVE MG/DL
C TRACH DNA SPEC QL NAA+PROBE: NEGATIVE
COLOR UR AUTO: YELLOW
GLUCOSE UR STRIP.AUTO-MCNC: NEGATIVE MG/DL
KETONES UR STRIP.AUTO-MCNC: NEGATIVE MG/DL
LEUKOCYTE ESTERASE UR QL STRIP.AUTO: NEGATIVE
N GONORRHOEA DNA SPEC QL NAA+PROBE: NEGATIVE
NITRITE UR QL STRIP.AUTO: NEGATIVE
PH UR STRIP.AUTO: 6.5 [PH] (ref 5–8)
PROT UR QL STRIP: NEGATIVE MG/DL
RBC UR QL AUTO: NEGATIVE
SP GR UR STRIP.AUTO: 1.01
SPECIMEN SOURCE: NORMAL
UROBILINOGEN UR STRIP-MCNC: 0.2 MG/DL

## 2023-06-21 PROCEDURE — 87591 N.GONORRHOEAE DNA AMP PROB: CPT

## 2023-06-21 PROCEDURE — 3078F DIAST BP <80 MM HG: CPT

## 2023-06-21 PROCEDURE — 3074F SYST BP LT 130 MM HG: CPT

## 2023-06-21 PROCEDURE — 99213 OFFICE O/P EST LOW 20 MIN: CPT

## 2023-06-21 PROCEDURE — 87491 CHLMYD TRACH DNA AMP PROBE: CPT

## 2023-06-21 PROCEDURE — 81002 URINALYSIS NONAUTO W/O SCOPE: CPT

## 2023-06-21 ASSESSMENT — PATIENT HEALTH QUESTIONNAIRE - PHQ9: CLINICAL INTERPRETATION OF PHQ2 SCORE: 0

## 2023-06-21 NOTE — PROGRESS NOTES
"CC:   Chief Complaint   Patient presents with    UTI     Recurring and wants to discuss what to do        HISTORY OF PRESENT ILLNESS: Patient is a 23 y.o. female established patient who presents today to discuss the following problems below:     Recurrent UTI  Patient reports that in March she had a UTI that was treated and she developed a subsequent yeast infection. She had a day of burning with urination last Friday/Saturday. Patient reports that she has not seen a gynecologist in many years. She has been on OCPs many years ago and does not want birth control.     She does report that she showers 10-12 times per week as she is very active and goes to the gym. She uses soaps and body washes during every shower.     Review of Systems: Otherwise negative except for as stated above.      Exam: /68   Pulse 61   Temp 36.9 °C (98.4 °F) (Temporal)   Resp 16   Ht 1.676 m (5' 6\")   Wt 63.3 kg (139 lb 8 oz)   SpO2 100%  Body mass index is 22.52 kg/m².    Physical Exam  Constitutional:       Appearance: Normal appearance.   Eyes:      Extraocular Movements: Extraocular movements intact.   Pulmonary:      Effort: Pulmonary effort is normal.   Musculoskeletal:      Cervical back: Normal range of motion.   Neurological:      General: No focal deficit present.      Mental Status: She is alert and oriented to person, place, and time.   Psychiatric:         Mood and Affect: Mood normal.         Behavior: Behavior normal.       Assessment/Plan:  23 y.o. female with the following -    1. Dysuria  - POCT Urinalysis    2. Recurrent UTI  Chronic, stable. Urine clear in office today. Reviewed urgent care note from 3/2023 and positive e coli urine culture result, discussed where e coli comes from and possible contamination sources. Discussed adequate water intake as well as relationship between citrus, spicy food, and caffeine to urinary pain.  Spent time educating patient on appropriate washing habits and limiting use of " fragrances or soaps as this can disrupt the natural pH of the vagina and urethra and may be contributing to her recurrent UTIs.    3. Encounter to establish care  Wishes to establish care with gynecologist for paps, not interested in birth control   - Referral to Gynecology    4. Screen for sexually transmitted diseases  - Chlamydia/GC, PCR (Urine); Future      Follow-up: Return if symptoms worsen or fail to improve.    Health Maintenance: Completed      Please note that this dictation was created using voice recognition software. I have made every reasonable attempt to correct obvious errors, but I expect that there are errors of grammar and possibly content that I did not discover before finalizing the note.    Electronically signed by FORD Lagos on June 21, 2023

## 2023-06-21 NOTE — ASSESSMENT & PLAN NOTE
Patient reports that in March she had a UTI that was treated and she developed a subsequent yeast infection. She had a day of burning with urination last Friday/Saturday. Patient reports that she has not seen a gynecologist in many years. She has been on OCPs many years ago and does not want birth control.     She does report that she showers 10-12 times per week as she is very active and goes to the gym. She uses soaps and body washes during every shower.

## 2024-08-29 ENCOUNTER — APPOINTMENT (RX ONLY)
Dept: URBAN - METROPOLITAN AREA CLINIC 4 | Facility: CLINIC | Age: 24
Setting detail: DERMATOLOGY
End: 2024-08-29

## 2024-08-29 DIAGNOSIS — Z71.89 OTHER SPECIFIED COUNSELING: ICD-10-CM

## 2024-08-29 DIAGNOSIS — L81.1 CHLOASMA: ICD-10-CM

## 2024-08-29 DIAGNOSIS — L70.0 ACNE VULGARIS: ICD-10-CM

## 2024-08-29 PROCEDURE — ? COUNSELING

## 2024-08-29 PROCEDURE — ? SUNSCREEN RECOMMENDATIONS

## 2024-08-29 PROCEDURE — ? MEDICATION COUNSELING

## 2024-08-29 PROCEDURE — ? PRESCRIPTION

## 2024-08-29 PROCEDURE — 99204 OFFICE O/P NEW MOD 45 MIN: CPT

## 2024-08-29 RX ORDER — CLINDAMYCIN PHOSPHATE/BENZOYL PEROXIDE/ADAPALENE 1.5; 31; 12 MG/G; MG/G; MG/G
GEL TOPICAL
Qty: 50 | Refills: 4 | Status: ERX | COMMUNITY
Start: 2024-08-29

## 2024-08-29 RX ORDER — HYDROQUINONE 4 %
1 CREAM (GRAM) TOPICAL QD
Qty: 30 | Refills: 0 | Status: ERX | COMMUNITY
Start: 2024-08-29

## 2024-08-29 RX ORDER — DOXYCYCLINE HYCLATE 100 MG/1
1 CAPSULE, GELATIN COATED ORAL QD
Qty: 90 | Refills: 0 | Status: ERX | COMMUNITY
Start: 2024-08-29

## 2024-08-29 RX ADMIN — CLINDAMYCIN PHOSPHATE/BENZOYL PEROXIDE/ADAPALENE: 1.5; 31; 12 GEL TOPICAL at 00:00

## 2024-08-29 RX ADMIN — Medication 1: at 00:00

## 2024-08-29 RX ADMIN — DOXYCYCLINE HYCLATE 1: 100 CAPSULE, GELATIN COATED ORAL at 00:00

## 2024-08-29 ASSESSMENT — LOCATION SIMPLE DESCRIPTION DERM
LOCATION SIMPLE: UPPER LIP
LOCATION SIMPLE: LEFT HAND
LOCATION SIMPLE: RIGHT CHEEK
LOCATION SIMPLE: RIGHT HAND
LOCATION SIMPLE: LEFT CHEEK

## 2024-08-29 ASSESSMENT — LOCATION DETAILED DESCRIPTION DERM
LOCATION DETAILED: RIGHT RADIAL DORSAL HAND
LOCATION DETAILED: RIGHT INFERIOR CENTRAL MALAR CHEEK
LOCATION DETAILED: LEFT CENTRAL MALAR CHEEK
LOCATION DETAILED: LEFT ULNAR DORSAL HAND
LOCATION DETAILED: PHILTRUM

## 2024-08-29 ASSESSMENT — LOCATION ZONE DERM
LOCATION ZONE: FACE
LOCATION ZONE: LIP
LOCATION ZONE: HAND

## 2024-08-29 NOTE — PROCEDURE: COUNSELING
Cleanser Recommendations: To use a mild facial cleanser at bedtime as directed
Aklief Pregnancy And Lactation Text: It is unknown if this medication is safe to use during pregnancy.  It is unknown if this medication is excreted in breast milk.  Breastfeeding women should use the topical cream on the smallest area of the skin for the shortest time needed while breastfeeding.  Do not apply to nipple and areola.
Sarecycline Counseling: Patient advised regarding possible photosensitivity and discoloration of the teeth, skin, lips, tongue and gums.  Patient instructed to avoid sunlight, if possible.  When exposed to sunlight, patients should wear protective clothing, sunglasses, and sunscreen.  The patient was instructed to call the office immediately if the following severe adverse effects occur:  hearing changes, easy bruising/bleeding, severe headache, or vision changes.  The patient verbalized understanding of the proper use and possible adverse effects of sarecycline.  All of the patient's questions and concerns were addressed.
Bactrim Counseling:  I discussed with the patient the risks of sulfa antibiotics including but not limited to GI upset, allergic reaction, drug rash, diarrhea, dizziness, photosensitivity, and yeast infections.  Rarely, more serious reactions can occur including but not limited to aplastic anemia, agranulocytosis, methemoglobinemia, blood dyscrasias, liver or kidney failure, lung infiltrates or desquamative/blistering drug rashes.
Doxycycline Pregnancy And Lactation Text: This medication is Pregnancy Category D and not consider safe during pregnancy. It is also excreted in breast milk but is considered safe for shorter treatment courses.
Minocycline Counseling: Patient advised regarding possible photosensitivity and discoloration of the teeth, skin, lips, tongue and gums.  Patient instructed to avoid sunlight, if possible.  When exposed to sunlight, patients should wear protective clothing, sunglasses, and sunscreen.  The patient was instructed to call the office immediately if the following severe adverse effects occur:  hearing changes, easy bruising/bleeding, severe headache, or vision changes.  The patient verbalized understanding of the proper use and possible adverse effects of minocycline.  All of the patient's questions and concerns were addressed.
Use Enhanced Medication Counseling?: No
Tazorac Counseling:  Patient advised that medication is irritating and drying.  Patient may need to apply sparingly and wash off after an hour before eventually leaving it on overnight.  The patient verbalized understanding of the proper use and possible adverse effects of tazorac.  All of the patient's questions and concerns were addressed.
Winlevi Pregnancy And Lactation Text: This medication is considered safe during pregnancy and breastfeeding.
Azithromycin Counseling:  I discussed with the patient the risks of azithromycin including but not limited to GI upset, allergic reaction, drug rash, diarrhea, and yeast infections.
Tetracycline Pregnancy And Lactation Text: This medication is Pregnancy Category D and not consider safe during pregnancy. It is also excreted in breast milk.
Topical Retinoid counseling:  Patient advised to apply a pea-sized amount only at bedtime and wait 30 minutes after washing their face before applying.  If too drying, patient may add a non-comedogenic moisturizer. The patient verbalized understanding of the proper use and possible adverse effects of retinoids.  All of the patient's questions and concerns were addressed.
Dapsone Pregnancy And Lactation Text: This medication is Pregnancy Category C and is not considered safe during pregnancy or breast feeding.
Topical Sulfur Applications Pregnancy And Lactation Text: This medication is Pregnancy Category C and has an unknown safety profile during pregnancy. It is unknown if this topical medication is excreted in breast milk.
High Dose Vitamin A Counseling: Side effects reviewed, pt to contact office should one occur.
Spironolactone Pregnancy And Lactation Text: This medication can cause feminization of the male fetus and should be avoided during pregnancy. The active metabolite is also found in breast milk.
Moisturizer Recommendations: Apply a moisturizing cream at bedtime after application of Epiduo
Topical Clindamycin Pregnancy And Lactation Text: This medication is Pregnancy Category B and is considered safe during pregnancy. It is unknown if it is excreted in breast milk.
Birth Control Pills Pregnancy And Lactation Text: This medication should be avoided if pregnant and for the first 30 days post-partum.
Benzoyl Peroxide Counseling: Patient counseled that medicine may cause skin irritation and bleach clothing.  In the event of skin irritation, the patient was advised to reduce the amount of the drug applied or use it less frequently.   The patient verbalized understanding of the proper use and possible adverse effects of benzoyl peroxide.  All of the patient's questions and concerns were addressed.
Bactrim Pregnancy And Lactation Text: This medication is Pregnancy Category D and is known to cause fetal risk.  It is also excreted in breast milk.
Isotretinoin Counseling: Patient should get monthly blood tests, not donate blood, not drive at night if vision affected, not share medication, and not undergo elective surgery for 6 months after tx completed. Side effects reviewed, pt to contact office should one occur.
Azelaic Acid Counseling: Patient counseled that medicine may cause skin irritation and to avoid applying near the eyes.  In the event of skin irritation, the patient was advised to reduce the amount of the drug applied or use it less frequently.   The patient verbalized understanding of the proper use and possible adverse effects of azelaic acid.  All of the patient's questions and concerns were addressed.
Erythromycin Counseling:  I discussed with the patient the risks of erythromycin including but not limited to GI upset, allergic reaction, drug rash, diarrhea, increase in liver enzymes, and yeast infections.
Tazorac Pregnancy And Lactation Text: This medication is not safe during pregnancy. It is unknown if this medication is excreted in breast milk.
Aklief counseling:  Patient advised to apply a pea-sized amount only at bedtime and wait 30 minutes after washing their face before applying.  If too drying, patient may add a non-comedogenic moisturizer.  The most commonly reported side effects including irritation, redness, scaling, dryness, stinging, burning, itching, and increased risk of sunburn.  The patient verbalized understanding of the proper use and possible adverse effects of retinoids.  All of the patient's questions and concerns were addressed.
Topical Retinoid Pregnancy And Lactation Text: This medication is Pregnancy Category C. It is unknown if this medication is excreted in breast milk.
Azithromycin Pregnancy And Lactation Text: This medication is considered safe during pregnancy and is also secreted in breast milk.
Detail Level: Detailed
Sunscreen Recommendations: Apply a sun screen moisturizing product in the morning time 30 minutes prior to sun exposure
High Dose Vitamin A Pregnancy And Lactation Text: High dose vitamin A therapy is contraindicated during pregnancy and breast feeding.
Winlevi Counseling:  I discussed with the patient the risks of topical clascoterone including but not limited to erythema, scaling, itching, and stinging. Patient voiced their understanding.
Doxycycline Counseling:  Patient counseled regarding possible photosensitivity and increased risk for sunburn.  Patient instructed to avoid sunlight, if possible.  When exposed to sunlight, patients should wear protective clothing, sunglasses, and sunscreen.  The patient was instructed to call the office immediately if the following severe adverse effects occur:  hearing changes, easy bruising/bleeding, severe headache, or vision changes.  The patient verbalized understanding of the proper use and possible adverse effects of doxycycline.  All of the patient's questions and concerns were addressed.
Tetracycline Counseling: Patient counseled regarding possible photosensitivity and increased risk for sunburn.  Patient instructed to avoid sunlight, if possible.  When exposed to sunlight, patients should wear protective clothing, sunglasses, and sunscreen.  The patient was instructed to call the office immediately if the following severe adverse effects occur:  hearing changes, easy bruising/bleeding, severe headache, or vision changes.  The patient verbalized understanding of the proper use and possible adverse effects of tetracycline.  All of the patient's questions and concerns were addressed. Patient understands to avoid pregnancy while on therapy due to potential birth defects.
Benzoyl Peroxide Pregnancy And Lactation Text: This medication is Pregnancy Category C. It is unknown if benzoyl peroxide is excreted in breast milk.
Dapsone Counseling: I discussed with the patient the risks of dapsone including but not limited to hemolytic anemia, agranulocytosis, rashes, methemoglobinemia, kidney failure, peripheral neuropathy, headaches, GI upset, and liver toxicity.  Patients who start dapsone require monitoring including baseline LFTs and weekly CBCs for the first month, then every month thereafter.  The patient verbalized understanding of the proper use and possible adverse effects of dapsone.  All of the patient's questions and concerns were addressed.
Spironolactone Counseling: Patient advised regarding risks of diarrhea, abdominal pain, hyperkalemia, birth defects (for female patients), liver toxicity and renal toxicity. The patient may need blood work to monitor liver and kidney function and potassium levels while on therapy. The patient verbalized understanding of the proper use and possible adverse effects of spironolactone.  All of the patient's questions and concerns were addressed.
Isotretinoin Pregnancy And Lactation Text: This medication is Pregnancy Category X and is considered extremely dangerous during pregnancy. It is unknown if it is excreted in breast milk.
Bpo Recommendations: Patient to apply Epiduo at bedtime as directed
Topical Sulfur Applications Counseling: Topical Sulfur Counseling: Patient counseled that this medication may cause skin irritation or allergic reactions.  In the event of skin irritation, the patient was advised to reduce the amount of the drug applied or use it less frequently.   The patient verbalized understanding of the proper use and possible adverse effects of topical sulfur application.  All of the patient's questions and concerns were addressed.
Topical Retinoids Recommendations: Apply Epiduo gel to acne prone areas as directed at bedtime
Topical Clindamycin Counseling: Patient counseled that this medication may cause skin irritation or allergic reactions.  In the event of skin irritation, the patient was advised to reduce the amount of the drug applied or use it less frequently.   The patient verbalized understanding of the proper use and possible adverse effects of clindamycin.  All of the patient's questions and concerns were addressed.
Birth Control Pills Counseling: Birth Control Pill Counseling: I discussed with the patient the potential side effects of OCPs including but not limited to increased risk of stroke, heart attack, thrombophlebitis, deep venous thrombosis, hepatic adenomas, breast changes, GI upset, headaches, and depression.  The patient verbalized understanding of the proper use and possible adverse effects of OCPs. All of the patient's questions and concerns were addressed.
Erythromycin Pregnancy And Lactation Text: This medication is Pregnancy Category B and is considered safe during pregnancy. It is also excreted in breast milk.
Azelaic Acid Pregnancy And Lactation Text: This medication is considered safe during pregnancy and breast feeding.

## 2024-08-29 NOTE — PROCEDURE: MEDICATION COUNSELING
Imiquimod Counseling:  I discussed with the patient the risks of imiquimod including but not limited to erythema, scaling, itching, weeping, crusting, and pain.  Patient understands that the inflammatory response to imiquimod is variable from person to person and was educated regarded proper titration schedule.  If flu-like symptoms develop, patient knows to discontinue the medication and contact us.
Bexarotene Counseling:  I discussed with the patient the risks of bexarotene including but not limited to hair loss, dry lips/skin/eyes, liver abnormalities, hyperlipidemia, pancreatitis, depression/suicidal ideation, photosensitivity, drug rash/allergic reactions, hypothyroidism, anemia, leukopenia, infection, cataracts, and teratogenicity.  Patient understands that they will need regular blood tests to check lipid profile, liver function tests, white blood cell count, thyroid function tests and pregnancy test if applicable.
Azithromycin Counseling:  I discussed with the patient the risks of azithromycin including but not limited to GI upset, allergic reaction, drug rash, diarrhea, and yeast infections.
Winlevi Pregnancy And Lactation Text: This medication is considered safe during pregnancy and breastfeeding.
Cimetidine Pregnancy And Lactation Text: This medication is Pregnancy Category B and is considered safe during pregnancy. It is also excreted in breast milk and breast feeding isn't recommended.
Finasteride Male Counseling: Finasteride Counseling:  I discussed with the patient the risks of use of finasteride including but not limited to decreased libido, decreased ejaculate volume, gynecomastia, and depression. Women should not handle medication.  All of the patient's questions and concerns were addressed.
Doxycycline Pregnancy And Lactation Text: This medication is Pregnancy Category D and not consider safe during pregnancy. It is also excreted in breast milk but is considered safe for shorter treatment courses.
Enbrel Pregnancy And Lactation Text: This medication is Pregnancy Category B and is considered safe during pregnancy. It is unknown if this medication is excreted in breast milk.
Rifampin Counseling: I discussed with the patient the risks of rifampin including but not limited to liver damage, kidney damage, red-orange body fluids, nausea/vomiting and severe allergy.
Rituxan Counseling:  I discussed with the patient the risks of Rituxan infusions. Side effects can include infusion reactions, severe drug rashes including mucocutaneous reactions, reactivation of latent hepatitis and other infections and rarely progressive multifocal leukoencephalopathy.  All of the patient's questions and concerns were addressed.
Opzelura Pregnancy And Lactation Text: There is insufficient data to evaluate drug-associated risk for major birth defects, miscarriage, or other adverse maternal or fetal outcomes.  There is a pregnancy registry that monitors pregnancy outcomes in pregnant persons exposed to the medication during pregnancy.  It is unknown if this medication is excreted in breast milk.  Do not breastfeed during treatment and for about 4 weeks after the last dose.
Olumiant Pregnancy And Lactation Text: Based on animal studies, Olumiant may cause embryo-fetal harm when administered to pregnant women.  The medication should not be used in pregnancy.  Breastfeeding is not recommended during treatment.
Griseofulvin Counseling:  I discussed with the patient the risks of griseofulvin including but not limited to photosensitivity, cytopenia, liver damage, nausea/vomiting and severe allergy.  The patient understands that this medication is best absorbed when taken with a fatty meal (e.g., ice cream or french fries).
Erivedge Counseling- I discussed with the patient the risks of Erivedge including but not limited to nausea, vomiting, diarrhea, constipation, weight loss, changes in the sense of taste, decreased appetite, muscle spasms, and hair loss.  The patient verbalized understanding of the proper use and possible adverse effects of Erivedge.  All of the patient's questions and concerns were addressed.
Gabapentin Counseling: I discussed with the patient the risks of gabapentin including but not limited to dizziness, somnolence, fatigue and ataxia.
Nsaids Counseling: NSAID Counseling: I discussed with the patient that NSAIDs should be taken with food. Prolonged use of NSAIDs can result in the development of stomach ulcers.  Patient advised to stop taking NSAIDs if abdominal pain occurs.  The patient verbalized understanding of the proper use and possible adverse effects of NSAIDs.  All of the patient's questions and concerns were addressed.
Soolantra Pregnancy And Lactation Text: This medication is Pregnancy Category C. This medication is considered safe during breast feeding.
Methotrexate Pregnancy And Lactation Text: This medication is Pregnancy Category X and is known to cause fetal harm. This medication is excreted in breast milk.
Stelara Counseling:  I discussed with the patient the risks of ustekinumab including but not limited to immunosuppression, malignancy, posterior leukoencephalopathy syndrome, and serious infections.  The patient understands that monitoring is required including a PPD at baseline and must alert us or the primary physician if symptoms of infection or other concerning signs are noted.
Gabapentin Pregnancy And Lactation Text: This medication is Pregnancy Category C and isn't considered safe during pregnancy. It is excreted in breast milk.
Griseofulvin Pregnancy And Lactation Text: This medication is Pregnancy Category X and is known to cause serious birth defects. It is unknown if this medication is excreted in breast milk but breast feeding should be avoided.
Protopic Counseling: Patient may experience a mild burning sensation during topical application. Protopic is not approved in children less than 2 years of age. There have been case reports of hematologic and skin malignancies in patients using topical calcineurin inhibitors although causality is questionable.
Bimzelx Counseling:  I discussed with the patient the risks of Bimzelx including but not limited to depression, immunosuppression, allergic reactions and infections.  The patient understands that monitoring is required including a PPD at baseline and must alert us or the primary physician if symptoms of infection or other concerning signs are noted.
Topical Metronidazole Counseling: Metronidazole is a topical antibiotic medication. You may experience burning, stinging, redness, or allergic reactions.  Please call our office if you develop any problems from using this medication.
Tranexamic Acid Counseling:  Patient advised of the small risk of bleeding problems with tranexamic acid. They were also instructed to call if they developed any nausea, vomiting or diarrhea. All of the patient's questions and concerns were addressed.
Azelaic Acid Pregnancy And Lactation Text: This medication is considered safe during pregnancy and breast feeding.
5-Fu Pregnancy And Lactation Text: This medication is Pregnancy Category X and contraindicated in pregnancy and in women who may become pregnant. It is unknown if this medication is excreted in breast milk.
Drysol Counseling:  I discussed with the patient the risks of drysol/aluminum chloride including but not limited to skin rash, itching, irritation, burning.
Rifampin Pregnancy And Lactation Text: This medication is Pregnancy Category C and it isn't know if it is safe during pregnancy. It is also excreted in breast milk and should not be used if you are breast feeding.
Azithromycin Pregnancy And Lactation Text: This medication is considered safe during pregnancy and is also secreted in breast milk.
Humira Counseling:  I discussed with the patient the risks of adalimumab including but not limited to myelosuppression, immunosuppression, autoimmune hepatitis, demyelinating diseases, lymphoma, and serious infections.  The patient understands that monitoring is required including a PPD at baseline and must alert us or the primary physician if symptoms of infection or other concerning signs are noted.
Finasteride Female Counseling: Finasteride Counseling:  I discussed with the patient the risks of use of finasteride including but not limited to decreased libido and sexual dysfunction. Explained the teratogenic nature of the medication and stressed the importance of not getting pregnant during treatment. All of the patient's questions and concerns were addressed.
VTAMA Counseling: I discussed with the patient that VTAMA is not for use in the eyes, mouth or mouth. They should call the office if they develop any signs of allergic reactions to VTAMA. The patient verbalized understanding of the proper use and possible adverse effects of VTAMA.  All of the patient's questions and concerns were addressed.
Bexarotene Pregnancy And Lactation Text: This medication is Pregnancy Category X and should not be given to women who are pregnant or may become pregnant. This medication should not be used if you are breast feeding.
Erythromycin Counseling:  I discussed with the patient the risks of erythromycin including but not limited to GI upset, allergic reaction, drug rash, diarrhea, increase in liver enzymes, and yeast infections.
Topical Metronidazole Pregnancy And Lactation Text: This medication is Pregnancy Category B and considered safe during pregnancy.  It is also considered safe to use while breastfeeding.
Bimzelx Pregnancy And Lactation Text: This medication crosses the placenta and the safety is uncertain during pregnancy. It is unknown if this medication is present in breast milk.
Rituxan Pregnancy And Lactation Text: This medication is Pregnancy Category C and it isn't know if it is safe during pregnancy. It is unknown if this medication is excreted in breast milk but similar antibodies are known to be excreted.
Arava Counseling:  Patient counseled regarding adverse effects of Arava including but not limited to nausea, vomiting, abnormalities in liver function tests. Patients may develop mouth sores, rash, diarrhea, and abnormalities in blood counts. The patient understands that monitoring is required including LFTs and blood counts.  There is a rare possibility of scarring of the liver and lung problems that can occur when taking methotrexate. Persistent nausea, loss of appetite, pale stools, dark urine, cough, and shortness of breath should be reported immediately. Patient advised to discontinue Arava treatment and consult with a physician prior to attempting conception. The patient will have to undergo a treatment to eliminate Arava from the body prior to conception.
Rinvoq Counseling: I discussed with the patient the risks of Rinvoq therapy including but not limited to upper respiratory tract infections, shingles, cold sores, bronchitis, nausea, cough, fever, acne, and headache. Live vaccines should be avoided.  This medication has been linked to serious infections; higher rate of mortality; malignancy and lymphoproliferative disorders; major adverse cardiovascular events; thrombosis; thrombocytopenia, anemia, and neutropenia; lipid elevations; liver enzyme elevations; and gastrointestinal perforations.
Imiquimod Pregnancy And Lactation Text: This medication is Pregnancy Category C. It is unknown if this medication is excreted in breast milk.
Erivedge Pregnancy And Lactation Text: This medication is Pregnancy Category X and is absolutely contraindicated during pregnancy. It is unknown if it is excreted in breast milk.
Doxepin Counseling:  Patient advised that the medication is sedating and not to drive a car after taking this medication. Patient informed of potential adverse effects including but not limited to dry mouth, urinary retention, and blurry vision.  The patient verbalized understanding of the proper use and possible adverse effects of doxepin.  All of the patient's questions and concerns were addressed.
Picato Counseling:  I discussed with the patient the risks of Picato including but not limited to erythema, scaling, itching, weeping, crusting, and pain.
Doxepin Pregnancy And Lactation Text: This medication is Pregnancy Category C and it isn't known if it is safe during pregnancy. It is also excreted in breast milk and breast feeding isn't recommended.
Libtayo Counseling- I discussed with the patient the risks of Libtayo including but not limited to nausea, vomiting, diarrhea, and bone or muscle pain.  The patient verbalized understanding of the proper use and possible adverse effects of Libtayo.  All of the patient's questions and concerns were addressed.
Hydroxyzine Pregnancy And Lactation Text: This medication is not safe during pregnancy and should not be taken. It is also excreted in breast milk and breast feeding isn't recommended.
Glycopyrrolate Counseling:  I discussed with the patient the risks of glycopyrrolate including but not limited to skin rash, drowsiness, dry mouth, difficulty urinating, and blurred vision.
Prednisone Counseling:  I discussed with the patient the risks of prolonged use of prednisone including but not limited to weight gain, insomnia, osteoporosis, mood changes, diabetes, susceptibility to infection, glaucoma and high blood pressure.  In cases where prednisone use is prolonged, patients should be monitored with blood pressure checks, serum glucose levels and an eye exam.  Additionally, the patient may need to be placed on GI prophylaxis, PCP prophylaxis, and calcium and vitamin D supplementation and/or a bisphosphonate.  The patient verbalized understanding of the proper use and the possible adverse effects of prednisone.  All of the patient's questions and concerns were addressed.
Siliq Counseling:  I discussed with the patient the risks of Siliq including but not limited to new or worsening depression, suicidal thoughts and behavior, immunosuppression, malignancy, posterior leukoencephalopathy syndrome, and serious infections.  The patient understands that monitoring is required including a PPD at baseline and must alert us or the primary physician if symptoms of infection or other concerning signs are noted. There is also a special program designed to monitor depression which is required with Siliq.
Topical Retinoid counseling:  Patient advised to apply a pea-sized amount only at bedtime and wait 30 minutes after washing their face before applying.  If too drying, patient may add a non-comedogenic moisturizer. The patient verbalized understanding of the proper use and possible adverse effects of retinoids.  All of the patient's questions and concerns were addressed.
Itraconazole Counseling:  I discussed with the patient the risks of itraconazole including but not limited to liver damage, nausea/vomiting, neuropathy, and severe allergy.  The patient understands that this medication is best absorbed when taken with acidic beverages such as non-diet cola or ginger ale.  The patient understands that monitoring is required including baseline LFTs and repeat LFTs at intervals.  The patient understands that they are to contact us or the primary physician if concerning signs are noted.
Nsaids Pregnancy And Lactation Text: These medications are considered safe up to 30 weeks gestation. It is excreted in breast milk.
Benzoyl Peroxide Counseling: Patient counseled that medicine may cause skin irritation and bleach clothing.  In the event of skin irritation, the patient was advised to reduce the amount of the drug applied or use it less frequently.   The patient verbalized understanding of the proper use and possible adverse effects of benzoyl peroxide.  All of the patient's questions and concerns were addressed.
Tranexamic Acid Pregnancy And Lactation Text: It is unknown if this medication is safe during pregnancy or breast feeding.
Protopic Pregnancy And Lactation Text: This medication is Pregnancy Category C. It is unknown if this medication is excreted in breast milk when applied topically.
Sarecycline Counseling: Patient advised regarding possible photosensitivity and discoloration of the teeth, skin, lips, tongue and gums.  Patient instructed to avoid sunlight, if possible.  When exposed to sunlight, patients should wear protective clothing, sunglasses, and sunscreen.  The patient was instructed to call the office immediately if the following severe adverse effects occur:  hearing changes, easy bruising/bleeding, severe headache, or vision changes.  The patient verbalized understanding of the proper use and possible adverse effects of sarecycline.  All of the patient's questions and concerns were addressed.
Finasteride Pregnancy And Lactation Text: This medication is absolutely contraindicated during pregnancy. It is unknown if it is excreted in breast milk.
Olanzapine Counseling- I discussed with the patient the common side effects of olanzapine including but are not limited to: lack of energy, dry mouth, increased appetite, sleepiness, tremor, constipation, dizziness, changes in behavior, or restlessness.  Explained that teenagers are more likely to experience headaches, abdominal pain, pain in the arms or legs, tiredness, and sleepiness.  Serious side effects include but are not limited: increased risk of death in elderly patients who are confused, have memory loss, or dementia-related psychosis; hyperglycemia; increased cholesterol and triglycerides; and weight gain.
Topical Steroids Counseling: I discussed with the patient that prolonged use of topical steroids can result in the increased appearance of superficial blood vessels (telangiectasias), lightening (hypopigmentation) and thinning of the skin (atrophy).  Patient understands to avoid using high potency steroids in skin folds, the groin or the face.  The patient verbalized understanding of the proper use and possible adverse effects of topical steroids.  All of the patient's questions and concerns were addressed.
Cimzia Counseling:  I discussed with the patient the risks of Cimzia including but not limited to immunosuppression, allergic reactions and infections.  The patient understands that monitoring is required including a PPD at baseline and must alert us or the primary physician if symptoms of infection or other concerning signs are noted.
Isotretinoin Counseling: Patient should get monthly blood tests, not donate blood, not drive at night if vision affected, not share medication, and not undergo elective surgery for 6 months after tx completed. Side effects reviewed, pt to contact office should one occur.
Bactrim Counseling:  I discussed with the patient the risks of sulfa antibiotics including but not limited to GI upset, allergic reaction, drug rash, diarrhea, dizziness, photosensitivity, and yeast infections.  Rarely, more serious reactions can occur including but not limited to aplastic anemia, agranulocytosis, methemoglobinemia, blood dyscrasias, liver or kidney failure, lung infiltrates or desquamative/blistering drug rashes.
Vtama Pregnancy And Lactation Text: It is unknown if this medication can cause problems during pregnancy and breastfeeding.
Erythromycin Pregnancy And Lactation Text: This medication is Pregnancy Category B and is considered safe during pregnancy. It is also excreted in breast milk.
Klisyri Counseling:  I discussed with the patient the risks of Klisyri including but not limited to erythema, scaling, itching, weeping, crusting, and pain.
Rinvoq Pregnancy And Lactation Text: Based on animal studies, Rinvoq may cause embryo-fetal harm when administered to pregnant women.  The medication should not be used in pregnancy.  Breastfeeding is not recommended during treatment and for 6 days after the last dose.
Libtayo Pregnancy And Lactation Text: This medication is contraindicated in pregnancy and when breast feeding.
Metronidazole Counseling:  I discussed with the patient the risks of metronidazole including but not limited to seizures, nausea/vomiting, a metallic taste in the mouth, nausea/vomiting and severe allergy.
Siliq Pregnancy And Lactation Text: The risk during pregnancy and breastfeeding is uncertain with this medication.
Clofazimine Counseling:  I discussed with the patient the risks of clofazimine including but not limited to skin and eye pigmentation, liver damage, nausea/vomiting, gastrointestinal bleeding and allergy.
Cellcept Counseling:  I discussed with the patient the risks of mycophenolate mofetil including but not limited to infection/immunosuppression, GI upset, hypokalemia, hypercholesterolemia, bone marrow suppression, lymphoproliferative disorders, malignancy, GI ulceration/bleed/perforation, colitis, interstitial lung disease, kidney failure, progressive multifocal leukoencephalopathy, and birth defects.  The patient understands that monitoring is required including a baseline creatinine and regular CBC testing. In addition, patient must alert us immediately if symptoms of infection or other concerning signs are noted.
Hyrimoz Counseling:  I discussed with the patient the risks of adalimumab including but not limited to myelosuppression, immunosuppression, autoimmune hepatitis, demyelinating diseases, lymphoma, and serious infections.  The patient understands that monitoring is required including a PPD at baseline and must alert us or the primary physician if symptoms of infection or other concerning signs are noted.
Zoryve Counseling:  I discussed with the patient that Zoryve is not for use in the eyes, mouth or vagina. The most commonly reported side effects include diarrhea, headache, insomnia, application site pain, upper respiratory tract infections, and urinary tract infections.  All of the patient's questions and concerns were addressed.
Sotyktu Counseling:  I discussed the most common side effects of Sotyktu including: common cold, sore throat, sinus infections, cold sores, canker sores, folliculitis, and acne.  I also discussed more serious side effects of Sotyktu including but not limited to: serious allergic reactions; increased risk for infections such as TB; cancers such as lymphomas; rhabdomyolysis and elevated CPK; and elevated triglycerides and liver enzymes. 
Glycopyrrolate Pregnancy And Lactation Text: This medication is Pregnancy Category B and is considered safe during pregnancy. It is unknown if it is excreted breast milk.
Itraconazole Pregnancy And Lactation Text: This medication is Pregnancy Category C and it isn't know if it is safe during pregnancy. It is also excreted in breast milk.
Taltz Counseling: I discussed with the patient the risks of ixekizumab including but not limited to immunosuppression, serious infections, worsening of inflammatory bowel disease and drug reactions.  The patient understands that monitoring is required including a PPD at baseline and must alert us or the primary physician if symptoms of infection or other concerning signs are noted.
Qbrexza Counseling:  I discussed with the patient the risks of Qbrexza including but not limited to headache, mydriasis, blurred vision, dry eyes, nasal dryness, dry mouth, dry throat, dry skin, urinary hesitation, and constipation.  Local skin reactions including erythema, burning, stinging, and itching can also occur.
Hydroxyzine Counseling: Patient advised that the medication is sedating and not to drive a car after taking this medication.  Patient informed of potential adverse effects including but not limited to dry mouth, urinary retention, and blurry vision.  The patient verbalized understanding of the proper use and possible adverse effects of hydroxyzine.  All of the patient's questions and concerns were addressed.
Oxybutynin Pregnancy And Lactation Text: This medication is Pregnancy Category B and is considered safe during pregnancy. It is unknown if it is excreted in breast milk.
Benzoyl Peroxide Pregnancy And Lactation Text: This medication is Pregnancy Category C. It is unknown if benzoyl peroxide is excreted in breast milk.
Valtrex Counseling: I discussed with the patient the risks of valacyclovir including but not limited to kidney damage, nausea, vomiting and severe allergy.  The patient understands that if the infection seems to be worsening or is not improving, they are to call.
Prednisone Pregnancy And Lactation Text: This medication is Pregnancy Category C and it isn't know if it is safe during pregnancy. This medication is excreted in breast milk.
Valtrex Pregnancy And Lactation Text: this medication is Pregnancy Category B and is considered safe during pregnancy. This medication is not directly found in breast milk but it's metabolite acyclovir is present.
Carac Counseling:  I discussed with the patient the risks of Carac including but not limited to erythema, scaling, itching, weeping, crusting, and pain.
Cimzia Pregnancy And Lactation Text: This medication crosses the placenta but can be considered safe in certain situations. Cimzia may be excreted in breast milk.
Olanzapine Pregnancy And Lactation Text: This medication is pregnancy category C.   There are no adequate and well controlled trials with olanzapine in pregnant females.  Olanzapine should be used during pregnancy only if the potential benefit justifies the potential risk to the fetus.   In a study in lactating healthy women, olanzapine was excreted in breast milk.  It is recommended that women taking olanzapine should not breast feed.
Sarecycline Pregnancy And Lactation Text: This medication is Pregnancy Category D and not consider safe during pregnancy. It is also excreted in breast milk.
Qbrexza Pregnancy And Lactation Text: There is no available data on Qbrexza use in pregnant women.  There is no available data on Qbrexza use in lactation.
Bactrim Pregnancy And Lactation Text: This medication is Pregnancy Category D and is known to cause fetal risk.  It is also excreted in breast milk.
Birth Control Pills Counseling: Birth Control Pill Counseling: I discussed with the patient the potential side effects of OCPs including but not limited to increased risk of stroke, heart attack, thrombophlebitis, deep venous thrombosis, hepatic adenomas, breast changes, GI upset, headaches, and depression.  The patient verbalized understanding of the proper use and possible adverse effects of OCPs. All of the patient's questions and concerns were addressed.
Isotretinoin Pregnancy And Lactation Text: This medication is Pregnancy Category X and is considered extremely dangerous during pregnancy. It is unknown if it is excreted in breast milk.
Topical Steroids Applications Pregnancy And Lactation Text: Most topical steroids are considered safe to use during pregnancy and lactation.  Any topical steroid applied to the breast or nipple should be washed off before breastfeeding.
Elidel Counseling: Patient may experience a mild burning sensation during topical application. Elidel is not approved in children less than 2 years of age. There have been case reports of hematologic and skin malignancies in patients using topical calcineurin inhibitors although causality is questionable.
Albendazole Counseling:  I discussed with the patient the risks of albendazole including but not limited to cytopenia, kidney damage, nausea/vomiting and severe allergy.  The patient understands that this medication is being used in an off-label manner.
Tazorac Counseling:  Patient advised that medication is irritating and drying.  Patient may need to apply sparingly and wash off after an hour before eventually leaving it on overnight.  The patient verbalized understanding of the proper use and possible adverse effects of tazorac.  All of the patient's questions and concerns were addressed.
Klisyri Pregnancy And Lactation Text: It is unknown if this medication can harm a developing fetus or if it is excreted in breast milk.
Albendazole Pregnancy And Lactation Text: This medication is Pregnancy Category C and it isn't known if it is safe during pregnancy. It is also excreted in breast milk.
Minoxidil Counseling: Minoxidil is a topical medication which can increase blood flow where it is applied. It is uncertain how this medication increases hair growth. Side effects are uncommon and include stinging and allergic reactions.
High Dose Vitamin A Counseling: Side effects reviewed, pt to contact office should one occur.
Cephalexin Counseling: I counseled the patient regarding use of cephalexin as an antibiotic for prophylactic and/or therapeutic purposes. Cephalexin (commonly prescribed under brand name Keflex) is a cephalosporin antibiotic which is active against numerous classes of bacteria, including most skin bacteria. Side effects may include nausea, diarrhea, gastrointestinal upset, rash, hives, yeast infections, and in rare cases, hepatitis, kidney disease, seizures, fever, confusion, neurologic symptoms, and others. Patients with severe allergies to penicillin medications are cautioned that there is about a 10% incidence of cross-reactivity with cephalosporins. When possible, patients with penicillin allergies should use alternatives to cephalosporins for antibiotic therapy.
Birth Control Pills Pregnancy And Lactation Text: This medication should be avoided if pregnant and for the first 30 days post-partum.
Metronidazole Pregnancy And Lactation Text: This medication is Pregnancy Category B and considered safe during pregnancy.  It is also excreted in breast milk.
Cibinqo Counseling: I discussed with the patient the risks of Cibinqo therapy including but not limited to common cold, nausea, headache, cold sores, increased blood CPK levels, dizziness, UTIs, fatigue, acne, and vomitting. Live vaccines should be avoided.  This medication has been linked to serious infections; higher rate of mortality; malignancy and lymphoproliferative disorders; major adverse cardiovascular events; thrombosis; thrombocytopenia and lymphopenia; lipid elevations; and retinal detachment.
Simponi Counseling:  I discussed with the patient the risks of golimumab including but not limited to myelosuppression, immunosuppression, autoimmune hepatitis, demyelinating diseases, lymphoma, and serious infections.  The patient understands that monitoring is required including a PPD at baseline and must alert us or the primary physician if symptoms of infection or other concerning signs are noted.
Sotyktu Pregnancy And Lactation Text: There is insufficient data to evaluate whether or not Sotyktu is safe to use during pregnancy.   It is not known if Sotyktu passes into breast milk and whether or not it is safe to use when breastfeeding.  
Ketoconazole Counseling:   Patient counseled regarding improving absorption with orange juice.  Adverse effects include but are not limited to breast enlargement, headache, diarrhea, nausea, upset stomach, liver function test abnormalities, taste disturbance, and stomach pain.  There is a rare possibility of liver failure that can occur when taking ketoconazole. The patient understands that monitoring of LFTs may be required, especially at baseline. The patient verbalized understanding of the proper use and possible adverse effects of ketoconazole.  All of the patient's questions and concerns were addressed.
Odomzo Counseling- I discussed with the patient the risks of Odomzo including but not limited to nausea, vomiting, diarrhea, constipation, weight loss, changes in the sense of taste, decreased appetite, muscle spasms, and hair loss.  The patient verbalized understanding of the proper use and possible adverse effects of Odomzo.  All of the patient's questions and concerns were addressed.
Propranolol Counseling:  I discussed with the patient the risks of propranolol including but not limited to low heart rate, low blood pressure, low blood sugar, restlessness and increased cold sensitivity. They should call the office if they experience any of these side effects.
Cellcept Pregnancy And Lactation Text: This medication is Pregnancy Category D and isn't considered safe during pregnancy. It is unknown if this medication is excreted in breast milk.
Hydroxychloroquine Counseling:  I discussed with the patient that a baseline ophthalmologic exam is needed at the start of therapy and every year thereafter while on therapy. A CBC may also be warranted for monitoring.  The side effects of this medication were discussed with the patient, including but not limited to agranulocytosis, aplastic anemia, seizures, rashes, retinopathy, and liver toxicity. Patient instructed to call the office should any adverse effect occur.  The patient verbalized understanding of the proper use and possible adverse effects of Plaquenil.  All the patient's questions and concerns were addressed.
Propranolol Pregnancy And Lactation Text: This medication is Pregnancy Category C and it isn't known if it is safe during pregnancy. It is excreted in breast milk.
Tazorac Pregnancy And Lactation Text: This medication is not safe during pregnancy. It is unknown if this medication is excreted in breast milk.
Tremfya Counseling: I discussed with the patient the risks of guselkumab including but not limited to immunosuppression, serious infections, and drug reactions.  The patient understands that monitoring is required including a PPD at baseline and must alert us or the primary physician if symptoms of infection or other concerning signs are noted.
Hydroxychloroquine Pregnancy And Lactation Text: This medication has been shown to cause fetal harm but it isn't assigned a Pregnancy Risk Category. There are small amounts excreted in breast milk.
Oral Minoxidil Counseling- I discussed with the patient the risks of oral minoxidil including but not limited to shortness of breath, swelling of the feet or ankles, dizziness, lightheadedness, unwanted hair growth and allergic reaction.  The patient verbalized understanding of the proper use and possible adverse effects of oral minoxidil.  All of the patient's questions and concerns were addressed.
Rhofade Counseling: Rhofade is a topical medication which can decrease superficial blood flow where applied. Side effects are uncommon and include stinging, redness and allergic reactions.
Ketoconazole Pregnancy And Lactation Text: This medication is Pregnancy Category C and it isn't know if it is safe during pregnancy. It is also excreted in breast milk and breast feeding isn't recommended.
Topical Sulfur Applications Counseling: Topical Sulfur Counseling: Patient counseled that this medication may cause skin irritation or allergic reactions.  In the event of skin irritation, the patient was advised to reduce the amount of the drug applied or use it less frequently.   The patient verbalized understanding of the proper use and possible adverse effects of topical sulfur application.  All of the patient's questions and concerns were addressed.
Cosentyx Counseling:  I discussed with the patient the risks of Cosentyx including but not limited to worsening of Crohn's disease, immunosuppression, allergic reactions and infections.  The patient understands that monitoring is required including a PPD at baseline and must alert us or the primary physician if symptoms of infection or other concerning signs are noted.
Use Enhanced Medication Counseling?: No
Tetracycline Counseling: Patient counseled regarding possible photosensitivity and increased risk for sunburn.  Patient instructed to avoid sunlight, if possible.  When exposed to sunlight, patients should wear protective clothing, sunglasses, and sunscreen.  The patient was instructed to call the office immediately if the following severe adverse effects occur:  hearing changes, easy bruising/bleeding, severe headache, or vision changes.  The patient verbalized understanding of the proper use and possible adverse effects of tetracycline.  All of the patient's questions and concerns were addressed. Patient understands to avoid pregnancy while on therapy due to potential birth defects.
Eucrisa Counseling: Patient may experience a mild burning sensation during topical application. Eucrisa is not approved in children less than 3 months of age.
Zyclara Counseling:  I discussed with the patient the risks of imiquimod including but not limited to erythema, scaling, itching, weeping, crusting, and pain.  Patient understands that the inflammatory response to imiquimod is variable from person to person and was educated regarded proper titration schedule.  If flu-like symptoms develop, patient knows to discontinue the medication and contact us.
Ilumya Counseling: I discussed with the patient the risks of tildrakizumab including but not limited to immunosuppression, malignancy, posterior leukoencephalopathy syndrome, and serious infections.  The patient understands that monitoring is required including a PPD at baseline and must alert us or the primary physician if symptoms of infection or other concerning signs are noted.
Spironolactone Counseling: Patient advised regarding risks of diarrhea, abdominal pain, hyperkalemia, birth defects (for female patients), liver toxicity and renal toxicity. The patient may need blood work to monitor liver and kidney function and potassium levels while on therapy. The patient verbalized understanding of the proper use and possible adverse effects of spironolactone.  All of the patient's questions and concerns were addressed.
Cephalexin Pregnancy And Lactation Text: This medication is Pregnancy Category B and considered safe during pregnancy.  It is also excreted in breast milk but can be used safely for shorter doses.
Topical Sulfur Applications Pregnancy And Lactation Text: This medication is considered safe during pregnancy and breast feeding secondary to limited systemic absorption.
High Dose Vitamin A Pregnancy And Lactation Text: High dose vitamin A therapy is contraindicated during pregnancy and breast feeding.
Cibinqo Pregnancy And Lactation Text: It is unknown if this medication will adversely affect pregnancy or breast feeding.  You should not take this medication if you are currently pregnant or planning a pregnancy or while breastfeeding.
Minocycline Counseling: Patient advised regarding possible photosensitivity and discoloration of the teeth, skin, lips, tongue and gums.  Patient instructed to avoid sunlight, if possible.  When exposed to sunlight, patients should wear protective clothing, sunglasses, and sunscreen.  The patient was instructed to call the office immediately if the following severe adverse effects occur:  hearing changes, easy bruising/bleeding, severe headache, or vision changes.  The patient verbalized understanding of the proper use and possible adverse effects of minocycline.  All of the patient's questions and concerns were addressed.
Minoxidil Pregnancy And Lactation Text: This medication has not been assigned a Pregnancy Risk Category but animal studies failed to show danger with the topical medication. It is unknown if the medication is excreted in breast milk.
Xeljanz Counseling: I discussed with the patient the risks of Xeljanz therapy including increased risk of infection, liver issues, headache, diarrhea, or cold symptoms. Live vaccines should be avoided. They were instructed to call if they have any problems.
Detail Level: Simple
Colchicine Counseling:  Patient counseled regarding adverse effects including but not limited to stomach upset (nausea, vomiting, stomach pain, or diarrhea).  Patient instructed to limit alcohol consumption while taking this medication.  Colchicine may reduce blood counts especially with prolonged use.  The patient understands that monitoring of kidney function and blood counts may be required, especially at baseline. The patient verbalized understanding of the proper use and possible adverse effects of colchicine.  All of the patient's questions and concerns were addressed.
Ivermectin Counseling:  Patient instructed to take medication on an empty stomach with a full glass of water.  Patient informed of potential adverse effects including but not limited to nausea, diarrhea, dizziness, itching, and swelling of the extremities or lymph nodes.  The patient verbalized understanding of the proper use and possible adverse effects of ivermectin.  All of the patient's questions and concerns were addressed.
Cyclophosphamide Counseling:  I discussed with the patient the risks of cyclophosphamide including but not limited to hair loss, hormonal abnormalities, decreased fertility, abdominal pain, diarrhea, nausea and vomiting, bone marrow suppression and infection. The patient understands that monitoring is required while taking this medication.
Cyclophosphamide Pregnancy And Lactation Text: This medication is Pregnancy Category D and it isn't considered safe during pregnancy. This medication is excreted in breast milk.
Low Dose Naltrexone Counseling- I discussed with the patient the potential risks and side effects of low dose naltrexone including but not limited to: more vivid dreams, headaches, nausea, vomiting, abdominal pain, fatigue, dizziness, and anxiety.
Rhofade Pregnancy And Lactation Text: This medication has not been assigned a Pregnancy Risk Category. It is unknown if the medication is excreted in breast milk.
Skyrizi Counseling: I discussed with the patient the risks of risankizumab-rzaa including but not limited to immunosuppression, and serious infections.  The patient understands that monitoring is required including a PPD at baseline and must alert us or the primary physician if symptoms of infection or other concerning signs are noted.
Terbinafine Counseling: Patient counseling regarding adverse effects of terbinafine including but not limited to headache, diarrhea, rash, upset stomach, liver function test abnormalities, itching, taste/smell disturbance, nausea, abdominal pain, and flatulence.  There is a rare possibility of liver failure that can occur when taking terbinafine.  The patient understands that a baseline LFT and kidney function test may be required. The patient verbalized understanding of the proper use and possible adverse effects of terbinafine.  All of the patient's questions and concerns were addressed.
Topical Clindamycin Counseling: Patient counseled that this medication may cause skin irritation or allergic reactions.  In the event of skin irritation, the patient was advised to reduce the amount of the drug applied or use it less frequently.   The patient verbalized understanding of the proper use and possible adverse effects of clindamycin.  All of the patient's questions and concerns were addressed.
Oral Minoxidil Pregnancy And Lactation Text: This medication should only be used when clearly needed if you are pregnant, attempting to become pregnant or breast feeding.
SSKI Counseling:  I discussed with the patient the risks of SSKI including but not limited to thyroid abnormalities, metallic taste, GI upset, fever, headache, acne, arthralgias, paraesthesias, lymphadenopathy, easy bleeding, arrhythmias, and allergic reaction.
Calcipotriene Counseling:  I discussed with the patient the risks of calcipotriene including but not limited to erythema, scaling, itching, and irritation.
Dupixent Counseling: I discussed with the patient the risks of dupilumab including but not limited to eye infection and irritation, cold sores, injection site reactions, worsening of asthma, allergic reactions and increased risk of parasitic infection.  Live vaccines should be avoided while taking dupilumab. Dupilumab will also interact with certain medications such as warfarin and cyclosporine. The patient understands that monitoring is required and they must alert us or the primary physician if symptoms of infection or other concerning signs are noted.
Otezla Counseling: The side effects of Otezla were discussed with the patient, including but not limited to worsening or new depression, weight loss, diarrhea, nausea, upper respiratory tract infection, and headache. Patient instructed to call the office should any adverse effect occur.  The patient verbalized understanding of the proper use and possible adverse effects of Otezla.  All the patient's questions and concerns were addressed.
Dutasteride Male Counseling: Dustasteride Counseling:  I discussed with the patient the risks of use of dutasteride including but not limited to decreased libido, decreased ejaculate volume, and gynecomastia. Women who can become pregnant should not handle medication.  All of the patient's questions and concerns were addressed.
Wartpeel Counseling:  I discussed with the patient the risks of Wartpeel including but not limited to erythema, scaling, itching, weeping, crusting, and pain.
Clindamycin Counseling: I counseled the patient regarding use of clindamycin as an antibiotic for prophylactic and/or therapeutic purposes. Clindamycin is active against numerous classes of bacteria, including skin bacteria. Side effects may include nausea, diarrhea, gastrointestinal upset, rash, hives, yeast infections, and in rare cases, colitis.
Calcipotriene Pregnancy And Lactation Text: The use of this medication during pregnancy or lactation is not recommended as there is insufficient data.
Litfulo Counseling: I discussed with the patient the risks of Litfulo therapy including but not limited to upper respiratory tract infections, shingles, cold sores, and nausea. Live vaccines should be avoided.  This medication has been linked to serious infections; higher rate of mortality; malignancy and lymphoproliferative disorders; major adverse cardiovascular events; thrombosis; gastrointestinal perforations; neutropenia; lymphopenia; anemia; liver enzyme elevations; and lipid elevations.
Mirvaso Counseling: Mirvaso is a topical medication which can decrease superficial blood flow where applied. Side effects are uncommon and include stinging, redness and allergic reactions.
Xelmamiez Pregnancy And Lactation Text: This medication is Pregnancy Category D and is not considered safe during pregnancy.  The risk during breast feeding is also uncertain.
Spironolactone Pregnancy And Lactation Text: This medication can cause feminization of the male fetus and should be avoided during pregnancy. The active metabolite is also found in breast milk.
Dapsone Counseling: I discussed with the patient the risks of dapsone including but not limited to hemolytic anemia, agranulocytosis, rashes, methemoglobinemia, kidney failure, peripheral neuropathy, headaches, GI upset, and liver toxicity.  Patients who start dapsone require monitoring including baseline LFTs and weekly CBCs for the first month, then every month thereafter.  The patient verbalized understanding of the proper use and possible adverse effects of dapsone.  All of the patient's questions and concerns were addressed.
Cyclosporine Counseling:  I discussed with the patient the risks of cyclosporine including but not limited to hypertension, gingival hyperplasia,myelosuppression, immunosuppression, liver damage, kidney damage, neurotoxicity, lymphoma, and serious infections. The patient understands that monitoring is required including baseline blood pressure, CBC, CMP, lipid panel and uric acid, and then 1-2 times monthly CMP and blood pressure.
Fluconazole Counseling:  Patient counseled regarding adverse effects of fluconazole including but not limited to headache, diarrhea, nausea, upset stomach, liver function test abnormalities, taste disturbance, and stomach pain.  There is a rare possibility of liver failure that can occur when taking fluconazole.  The patient understands that monitoring of LFTs and kidney function test may be required, especially at baseline. The patient verbalized understanding of the proper use and possible adverse effects of fluconazole.  All of the patient's questions and concerns were addressed.
Infliximab Counseling:  I discussed with the patient the risks of infliximab including but not limited to myelosuppression, immunosuppression, autoimmune hepatitis, demyelinating diseases, lymphoma, and serious infections.  The patient understands that monitoring is required including a PPD at baseline and must alert us or the primary physician if symptoms of infection or other concerning signs are noted.
Xolair Counseling:  Patient informed of potential adverse effects including but not limited to fever, muscle aches, rash and allergic reactions.  The patient verbalized understanding of the proper use and possible adverse effects of Xolair.  All of the patient's questions and concerns were addressed.
Solaraze Counseling:  I discussed with the patient the risks of Solaraze including but not limited to erythema, scaling, itching, weeping, crusting, and pain.
Sski Pregnancy And Lactation Text: This medication is Pregnancy Category D and isn't considered safe during pregnancy. It is excreted in breast milk.
Aklief counseling:  Patient advised to apply a pea-sized amount only at bedtime and wait 30 minutes after washing their face before applying.  If too drying, patient may add a non-comedogenic moisturizer.  The most commonly reported side effects including irritation, redness, scaling, dryness, stinging, burning, itching, and increased risk of sunburn.  The patient verbalized understanding of the proper use and possible adverse effects of retinoids.  All of the patient's questions and concerns were addressed.
Low Dose Naltrexone Pregnancy And Lactation Text: Naltrexone is pregnancy category C.  There have been no adequate and well-controlled studies in pregnant women.  It should be used in pregnancy only if the potential benefit justifies the potential risk to the fetus.   Limited data indicates that naltrexone is minimally excreted into breastmilk.
Opioid Counseling: I discussed with the patient the potential side effects of opioids including but not limited to addiction, altered mental status, and depression. I stressed avoiding alcohol, benzodiazepines, muscle relaxants and sleep aids unless specifically okayed by a physician. The patient verbalized understanding of the proper use and possible adverse effects of opioids. All of the patient's questions and concerns were addressed. They were instructed to flush the remaining pills down the toilet if they did not need them for pain.
Cantharidin Counseling:  I discussed with the patient the risks of Cantharidin including but not limited to pain, redness, burning, itching, and blistering.
Dutasteride Female Counseling: Dutasteride Counseling:  I discussed with the patient the risks of use of dutasteride including but not limited to decreased libido and sexual dysfunction. Explained the teratogenic nature of the medication and stressed the importance of not getting pregnant during treatment. All of the patient's questions and concerns were addressed.
Topical Ketoconazole Counseling: Patient counseled that this medication may cause skin irritation or allergic reactions.  In the event of skin irritation, the patient was advised to reduce the amount of the drug applied or use it less frequently.   The patient verbalized understanding of the proper use and possible adverse effects of ketoconazole.  All of the patient's questions and concerns were addressed.
Otezla Pregnancy And Lactation Text: This medication is Pregnancy Category C and it isn't known if it is safe during pregnancy. It is unknown if it is excreted in breast milk.
Niacinamide Counseling: I recommended taking niacin or niacinamide, also know as vitamin B3, twice daily. Recent evidence suggests that taking vitamin B3 (500 mg twice daily) can reduce the risk of actinic keratoses and non-melanoma skin cancers. Side effects of vitamin B3 include flushing and headache.
Xolair Pregnancy And Lactation Text: This medication is Pregnancy Category B and is considered safe during pregnancy. This medication is excreted in breast milk.
Acitretin Counseling:  I discussed with the patient the risks of acitretin including but not limited to hair loss, dry lips/skin/eyes, liver damage, hyperlipidemia, depression/suicidal ideation, photosensitivity.  Serious rare side effects can include but are not limited to pancreatitis, pseudotumor cerebri, bony changes, clot formation/stroke/heart attack.  Patient understands that alcohol is contraindicated since it can result in liver toxicity and significantly prolong the elimination of the drug by many years.
Adbry Counseling: I discussed with the patient the risks of tralokinumab including but not limited to eye infection and irritation, cold sores, injection site reactions, worsening of asthma, allergic reactions and increased risk of parasitic infection.  Live vaccines should be avoided while taking tralokinumab. The patient understands that monitoring is required and they must alert us or the primary physician if symptoms of infection or other concerning signs are noted.
Solaraze Pregnancy And Lactation Text: This medication is Pregnancy Category B and is considered safe. There is some data to suggest avoiding during the third trimester. It is unknown if this medication is excreted in breast milk.
Dupixent Pregnancy And Lactation Text: This medication likely crosses the placenta but the risk for the fetus is uncertain. This medication is excreted in breast milk.
Clindamycin Pregnancy And Lactation Text: This medication can be used in pregnancy if certain situations. Clindamycin is also present in breast milk.
Hydroquinone Counseling:  Patient advised that medication may result in skin irritation, lightening (hypopigmentation), dryness, and burning.  In the event of skin irritation, the patient was advised to reduce the amount of the drug applied or use it less frequently.  Rarely, spots that are treated with hydroquinone can become darker (pseudoochronosis).  Should this occur, patient instructed to stop medication and call the office. The patient verbalized understanding of the proper use and possible adverse effects of hydroquinone.  All of the patient's questions and concerns were addressed.
Quinolones Counseling:  I discussed with the patient the risks of fluoroquinolones including but not limited to GI upset, allergic reaction, drug rash, diarrhea, dizziness, photosensitivity, yeast infections, liver function test abnormalities, tendonitis/tendon rupture.
Litfulo Pregnancy And Lactation Text: Based on animal studies, Lifulo may cause embryo-fetal harm when administered to pregnant women.  The medication should not be used in pregnancy.  Breastfeeding is not recommended during treatment.
Cimetidine Counseling:  I discussed with the patient the risks of Cimetidine including but not limited to gynecomastia, headache, diarrhea, nausea, drowsiness, arrhythmias, pancreatitis, skin rashes, psychosis, bone marrow suppression and kidney toxicity.
Opzelura Counseling:  I discussed with the patient the risks of Opzelura including but not limited to nasopharngitis, bronchitis, ear infection, eosinophila, hives, diarrhea, folliculitis, tonsillitis, and rhinorrhea.  Taken orally, this medication has been linked to serious infections; higher rate of mortality; malignancy and lymphoproliferative disorders; major adverse cardiovascular events; thrombosis; thrombocytopenia, anemia, and neutropenia; and lipid elevations.
Doxycycline Counseling:  Patient counseled regarding possible photosensitivity and increased risk for sunburn.  Patient instructed to avoid sunlight, if possible.  When exposed to sunlight, patients should wear protective clothing, sunglasses, and sunscreen.  The patient was instructed to call the office immediately if the following severe adverse effects occur:  hearing changes, easy bruising/bleeding, severe headache, or vision changes.  The patient verbalized understanding of the proper use and possible adverse effects of doxycycline.  All of the patient's questions and concerns were addressed.
Cantharidin Pregnancy And Lactation Text: This medication has not been proven safe during pregnancy. It is unknown if this medication is excreted in breast milk.
Olumiant Counseling: I discussed with the patient the risks of Olumiant therapy including but not limited to upper respiratory tract infections, shingles, cold sores, and nausea. Live vaccines should be avoided.  This medication has been linked to serious infections; higher rate of mortality; malignancy and lymphoproliferative disorders; major adverse cardiovascular events; thrombosis; gastrointestinal perforations; neutropenia; lymphopenia; anemia; liver enzyme elevations; and lipid elevations.
Dapsone Pregnancy And Lactation Text: This medication is Pregnancy Category C and is not considered safe during pregnancy or breast feeding.
Spevigo Counseling: I discussed with the patient the risks of Spevigo including but not limited to fatigue, nasuea, vomiting, headache, pruritus, urinary tract infection, an infusion related reactions.  The patient understands that monitoring is required including screening for tuberculosis at baseline and yearly screening thereafter while continuing Spevigo therapy. They should contact us if symptoms of infection or other concerning signs are noted.
Azathioprine Counseling:  I discussed with the patient the risks of azathioprine including but not limited to myelosuppression, immunosuppression, hepatotoxicity, lymphoma, and infections.  The patient understands that monitoring is required including baseline LFTs, Creatinine, possible TPMP genotyping and weekly CBCs for the first month and then every 2 weeks thereafter.  The patient verbalized understanding of the proper use and possible adverse effects of azathioprine.  All of the patient's questions and concerns were addressed.
Aklief Pregnancy And Lactation Text: It is unknown if this medication is safe to use during pregnancy.  It is unknown if this medication is excreted in breast milk.  Breastfeeding women should use the topical cream on the smallest area of the skin for the shortest time needed while breastfeeding.  Do not apply to nipple and areola.
Thalidomide Counseling: I discussed with the patient the risks of thalidomide including but not limited to birth defects, anxiety, weakness, chest pain, dizziness, cough and severe allergy.
Azelaic Acid Counseling: Patient counseled that medicine may cause skin irritation and to avoid applying near the eyes.  In the event of skin irritation, the patient was advised to reduce the amount of the drug applied or use it less frequently.   The patient verbalized understanding of the proper use and possible adverse effects of azelaic acid.  All of the patient's questions and concerns were addressed.
Methotrexate Counseling:  Patient counseled regarding adverse effects of methotrexate including but not limited to nausea, vomiting, abnormalities in liver function tests. Patients may develop mouth sores, rash, diarrhea, and abnormalities in blood counts. The patient understands that monitoring is required including LFT's and blood counts.  There is a rare possibility of scarring of the liver and lung problems that can occur when taking methotrexate. Persistent nausea, loss of appetite, pale stools, dark urine, cough, and shortness of breath should be reported immediately. Patient advised to discontinue methotrexate treatment at least three months before attempting to become pregnant.  I discussed the need for folate supplements while taking methotrexate.  These supplements can decrease side effects during methotrexate treatment. The patient verbalized understanding of the proper use and possible adverse effects of methotrexate.  All of the patient's questions and concerns were addressed.
Adbry Pregnancy And Lactation Text: It is unknown if this medication will adversely affect pregnancy or breast feeding.
Niacinamide Pregnancy And Lactation Text: These medications are considered safe during pregnancy.
Oxybutynin Counseling:  I discussed with the patient the risks of oxybutynin including but not limited to skin rash, drowsiness, dry mouth, difficulty urinating, and blurred vision.
Spevigo Pregnancy And Lactation Text: The risk during pregnancy and breastfeeding is uncertain with this medication. This medication does cross the placenta. It is unknown if this medication is found in breast milk.
Opioid Pregnancy And Lactation Text: These medications can lead to premature delivery and should be avoided during pregnancy. These medications are also present in breast milk in small amounts.
Dutasteride Pregnancy And Lactation Text: This medication is absolutely contraindicated in women, especially during pregnancy and breast feeding. Feminization of male fetuses is possible if taking while pregnant.
Winlevi Counseling:  I discussed with the patient the risks of topical clascoterone including but not limited to erythema, scaling, itching, and stinging. Patient voiced their understanding.
Enbrel Counseling:  I discussed with the patient the risks of etanercept including but not limited to myelosuppression, immunosuppression, autoimmune hepatitis, demyelinating diseases, lymphoma, and infections.  The patient understands that monitoring is required including a PPD at baseline and must alert us or the primary physician if symptoms of infection or other concerning signs are noted.
5-Fu Counseling: 5-Fluorouracil Counseling:  I discussed with the patient the risks of 5-fluorouracil including but not limited to erythema, scaling, itching, weeping, crusting, and pain.
Soolantra Counseling: I discussed with the patients the risks of topial Soolantra. This is a medicine which decreases the number of mites and inflammation in the skin. You experience burning, stinging, eye irritation or allergic reactions.  Please call our office if you develop any problems from using this medication.
Acitretin Pregnancy And Lactation Text: This medication is Pregnancy Category X and should not be given to women who are pregnant or may become pregnant in the future. This medication is excreted in breast milk.